# Patient Record
Sex: MALE | Race: OTHER | NOT HISPANIC OR LATINO
[De-identification: names, ages, dates, MRNs, and addresses within clinical notes are randomized per-mention and may not be internally consistent; named-entity substitution may affect disease eponyms.]

---

## 2017-01-10 ENCOUNTER — APPOINTMENT (OUTPATIENT)
Dept: OTHER | Facility: CLINIC | Age: 57
End: 2017-01-10

## 2017-01-10 ENCOUNTER — LABORATORY RESULT (OUTPATIENT)
Age: 57
End: 2017-01-10

## 2017-01-10 VITALS
BODY MASS INDEX: 34.96 KG/M2 | HEIGHT: 69 IN | HEART RATE: 61 BPM | OXYGEN SATURATION: 95 % | SYSTOLIC BLOOD PRESSURE: 136 MMHG | DIASTOLIC BLOOD PRESSURE: 89 MMHG | WEIGHT: 236 LBS

## 2017-01-10 DIAGNOSIS — Z23 ENCOUNTER FOR IMMUNIZATION: ICD-10-CM

## 2017-01-11 LAB
ALBUMIN SERPL ELPH-MCNC: 4.3 G/DL
ALP BLD-CCNC: 98 U/L
ALT SERPL-CCNC: 21 U/L
ANION GAP SERPL CALC-SCNC: 13 MMOL/L
APPEARANCE: CLEAR
AST SERPL-CCNC: 18 U/L
BASOPHILS # BLD AUTO: 0.05 K/UL
BASOPHILS NFR BLD AUTO: 0.2 %
BILIRUB SERPL-MCNC: 0.3 MG/DL
BILIRUBIN URINE: NEGATIVE
BLOOD URINE: NEGATIVE
BUN SERPL-MCNC: 18 MG/DL
CALCIUM SERPL-MCNC: 9.6 MG/DL
CHLORIDE SERPL-SCNC: 104 MMOL/L
CHOLEST SERPL-MCNC: 185 MG/DL
CHOLEST/HDLC SERPL: 2.9 RATIO
CO2 SERPL-SCNC: 24 MMOL/L
COLOR: YELLOW
CREAT SERPL-MCNC: 0.85 MG/DL
EOSINOPHIL # BLD AUTO: 0.16 K/UL
EOSINOPHIL NFR BLD AUTO: 0.5 %
GLUCOSE QUALITATIVE U: NORMAL MG/DL
GLUCOSE SERPL-MCNC: 80 MG/DL
HCT VFR BLD CALC: 42.6 %
HDLC SERPL-MCNC: 64 MG/DL
HGB BLD-MCNC: 14.3 G/DL
IMM GRANULOCYTES NFR BLD AUTO: 0.2 %
KETONES URINE: NEGATIVE
LDLC SERPL CALC-MCNC: 92 MG/DL
LEUKOCYTE ESTERASE URINE: NEGATIVE
LYMPHOCYTES # BLD AUTO: 24.01 K/UL
LYMPHOCYTES NFR BLD AUTO: 80.8 %
MAN DIFF?: NORMAL
MCHC RBC-ENTMCNC: 30 PG
MCHC RBC-ENTMCNC: 33.6 GM/DL
MCV RBC AUTO: 89.3 FL
MONOCYTES # BLD AUTO: 0.66 K/UL
MONOCYTES NFR BLD AUTO: 2.2 %
NEUTROPHILS # BLD AUTO: 4.76 K/UL
NEUTROPHILS NFR BLD AUTO: 16.1 %
NITRITE URINE: NEGATIVE
PH URINE: 5.5
PLATELET # BLD AUTO: 246 K/UL
POTASSIUM SERPL-SCNC: 4.6 MMOL/L
PROT SERPL-MCNC: 7.1 G/DL
PROTEIN URINE: NEGATIVE MG/DL
RBC # BLD: 4.77 M/UL
RBC # FLD: 13.1 %
SODIUM SERPL-SCNC: 141 MMOL/L
SPECIFIC GRAVITY URINE: 1.02
TRIGL SERPL-MCNC: 145 MG/DL
UROBILINOGEN URINE: NORMAL MG/DL
WBC # FLD AUTO: 29.71 K/UL

## 2017-03-21 ENCOUNTER — FORM ENCOUNTER (OUTPATIENT)
Age: 57
End: 2017-03-21

## 2017-07-21 ENCOUNTER — APPOINTMENT (OUTPATIENT)
Dept: HEMATOLOGY ONCOLOGY | Facility: CLINIC | Age: 57
End: 2017-07-21

## 2017-07-21 VITALS
RESPIRATION RATE: 12 BRPM | BODY MASS INDEX: 34.7 KG/M2 | TEMPERATURE: 98.4 F | WEIGHT: 235 LBS | SYSTOLIC BLOOD PRESSURE: 140 MMHG | OXYGEN SATURATION: 99 % | HEART RATE: 76 BPM | DIASTOLIC BLOOD PRESSURE: 84 MMHG

## 2017-07-21 RX ORDER — MULTIVITAMIN
TABLET ORAL
Refills: 0 | Status: ACTIVE | COMMUNITY

## 2017-07-21 RX ORDER — ASPIRIN 81 MG
81 TABLET, DELAYED RELEASE (ENTERIC COATED) ORAL
Refills: 0 | Status: ACTIVE | COMMUNITY

## 2017-07-24 LAB
ALBUMIN SERPL ELPH-MCNC: 4.2 G/DL
ALP BLD-CCNC: 94 U/L
ALT SERPL-CCNC: 24 U/L
ANION GAP SERPL CALC-SCNC: 15 MMOL/L
AST SERPL-CCNC: 24 U/L
BILIRUB SERPL-MCNC: 0.5 MG/DL
BUN SERPL-MCNC: 16 MG/DL
CALCIUM SERPL-MCNC: 9.6 MG/DL
CHLORIDE SERPL-SCNC: 104 MMOL/L
CO2 SERPL-SCNC: 23 MMOL/L
CREAT SERPL-MCNC: 0.96 MG/DL
GLUCOSE SERPL-MCNC: 80 MG/DL
LDH SERPL-CCNC: 207 U/L
POTASSIUM SERPL-SCNC: 4.4 MMOL/L
PROT SERPL-MCNC: 7.1 G/DL
SODIUM SERPL-SCNC: 142 MMOL/L
URATE SERPL-MCNC: 6.9 MG/DL

## 2018-01-09 ENCOUNTER — APPOINTMENT (OUTPATIENT)
Dept: OTHER | Facility: CLINIC | Age: 58
End: 2018-01-09
Payer: COMMERCIAL

## 2018-01-09 ENCOUNTER — LABORATORY RESULT (OUTPATIENT)
Age: 58
End: 2018-01-09

## 2018-01-09 VITALS
HEIGHT: 69 IN | HEART RATE: 63 BPM | SYSTOLIC BLOOD PRESSURE: 145 MMHG | OXYGEN SATURATION: 98 % | WEIGHT: 232 LBS | RESPIRATION RATE: 14 BRPM | DIASTOLIC BLOOD PRESSURE: 100 MMHG | BODY MASS INDEX: 34.36 KG/M2

## 2018-01-09 DIAGNOSIS — D72.820 LYMPHOCYTOSIS (SYMPTOMATIC): ICD-10-CM

## 2018-01-09 PROCEDURE — 99396 PREV VISIT EST AGE 40-64: CPT

## 2018-01-09 PROCEDURE — 96150: CPT

## 2018-01-09 PROCEDURE — 94010 BREATHING CAPACITY TEST: CPT

## 2018-01-09 PROCEDURE — 99214 OFFICE O/P EST MOD 30 MIN: CPT | Mod: 25

## 2018-01-10 LAB
ALBUMIN SERPL ELPH-MCNC: 4.2 G/DL
ALP BLD-CCNC: 82 U/L
ALT SERPL-CCNC: 21 U/L
ANION GAP SERPL CALC-SCNC: 15 MMOL/L
APPEARANCE: CLEAR
AST SERPL-CCNC: 19 U/L
BASOPHILS # BLD AUTO: 0.03 K/UL
BASOPHILS NFR BLD AUTO: 0.1 %
BILIRUB SERPL-MCNC: 0.4 MG/DL
BILIRUBIN URINE: NEGATIVE
BLOOD URINE: NEGATIVE
BUN SERPL-MCNC: 17 MG/DL
CALCIUM SERPL-MCNC: 9.2 MG/DL
CHLORIDE SERPL-SCNC: 103 MMOL/L
CHOLEST SERPL-MCNC: 180 MG/DL
CHOLEST/HDLC SERPL: 3.3 RATIO
CO2 SERPL-SCNC: 23 MMOL/L
COLOR: YELLOW
CREAT SERPL-MCNC: 0.71 MG/DL
EOSINOPHIL # BLD AUTO: 0.14 K/UL
EOSINOPHIL NFR BLD AUTO: 0.5 %
GLUCOSE QUALITATIVE U: NEGATIVE MG/DL
GLUCOSE SERPL-MCNC: 91 MG/DL
HCT VFR BLD CALC: 42 %
HDLC SERPL-MCNC: 54 MG/DL
HGB BLD-MCNC: 14.1 G/DL
IMM GRANULOCYTES NFR BLD AUTO: 0.2 %
KETONES URINE: NEGATIVE
LDLC SERPL CALC-MCNC: 105 MG/DL
LEUKOCYTE ESTERASE URINE: NEGATIVE
LYMPHOCYTES # BLD AUTO: 20.58 K/UL
LYMPHOCYTES NFR BLD AUTO: 78.9 %
MAN DIFF?: NORMAL
MCHC RBC-ENTMCNC: 29.9 PG
MCHC RBC-ENTMCNC: 33.6 GM/DL
MCV RBC AUTO: 89 FL
MONOCYTES # BLD AUTO: 0.64 K/UL
MONOCYTES NFR BLD AUTO: 2.5 %
NEUTROPHILS # BLD AUTO: 4.64 K/UL
NEUTROPHILS NFR BLD AUTO: 17.8 %
NITRITE URINE: NEGATIVE
PH URINE: 6
PLATELET # BLD AUTO: 246 K/UL
POTASSIUM SERPL-SCNC: 4.7 MMOL/L
PROT SERPL-MCNC: 7.1 G/DL
PROTEIN URINE: NEGATIVE MG/DL
RBC # BLD: 4.72 M/UL
RBC # FLD: 13.2 %
SODIUM SERPL-SCNC: 141 MMOL/L
SPECIFIC GRAVITY URINE: 1.02
TRIGL SERPL-MCNC: 106 MG/DL
UROBILINOGEN URINE: NEGATIVE MG/DL
WBC # FLD AUTO: 26.08 K/UL

## 2018-01-16 ENCOUNTER — RX RENEWAL (OUTPATIENT)
Age: 58
End: 2018-01-16

## 2018-01-16 ENCOUNTER — TRANSCRIPTION ENCOUNTER (OUTPATIENT)
Age: 58
End: 2018-01-16

## 2018-04-18 ENCOUNTER — RX RENEWAL (OUTPATIENT)
Age: 58
End: 2018-04-18

## 2018-07-31 ENCOUNTER — APPOINTMENT (OUTPATIENT)
Dept: HEMATOLOGY ONCOLOGY | Facility: CLINIC | Age: 58
End: 2018-07-31
Payer: COMMERCIAL

## 2018-07-31 VITALS
OXYGEN SATURATION: 97 % | WEIGHT: 234 LBS | DIASTOLIC BLOOD PRESSURE: 92 MMHG | HEART RATE: 63 BPM | BODY MASS INDEX: 34.56 KG/M2 | TEMPERATURE: 98.5 F | RESPIRATION RATE: 14 BRPM | SYSTOLIC BLOOD PRESSURE: 130 MMHG

## 2018-07-31 PROCEDURE — 85025 COMPLETE CBC W/AUTO DIFF WBC: CPT

## 2018-07-31 PROCEDURE — 99215 OFFICE O/P EST HI 40 MIN: CPT

## 2018-08-01 LAB
ALBUMIN SERPL ELPH-MCNC: 4.3 G/DL
ALP BLD-CCNC: 101 U/L
ALT SERPL-CCNC: 15 U/L
ANION GAP SERPL CALC-SCNC: 11 MMOL/L
AST SERPL-CCNC: 17 U/L
BILIRUB SERPL-MCNC: 0.4 MG/DL
BUN SERPL-MCNC: 16 MG/DL
CALCIUM SERPL-MCNC: 9.4 MG/DL
CHLORIDE SERPL-SCNC: 104 MMOL/L
CO2 SERPL-SCNC: 28 MMOL/L
CREAT SERPL-MCNC: 0.67 MG/DL
GLUCOSE SERPL-MCNC: 87 MG/DL
LDH SERPL-CCNC: 173 U/L
POTASSIUM SERPL-SCNC: 4.8 MMOL/L
PROT SERPL-MCNC: 6.6 G/DL
SODIUM SERPL-SCNC: 143 MMOL/L
URATE SERPL-MCNC: 4.9 MG/DL

## 2018-10-01 ENCOUNTER — RX RENEWAL (OUTPATIENT)
Age: 58
End: 2018-10-01

## 2019-01-29 ENCOUNTER — LABORATORY RESULT (OUTPATIENT)
Age: 59
End: 2019-01-29

## 2019-01-29 ENCOUNTER — APPOINTMENT (OUTPATIENT)
Dept: UROLOGY | Facility: CLINIC | Age: 59
End: 2019-01-29
Payer: COMMERCIAL

## 2019-01-29 ENCOUNTER — APPOINTMENT (OUTPATIENT)
Dept: OTHER | Facility: CLINIC | Age: 59
End: 2019-01-29
Payer: COMMERCIAL

## 2019-01-29 VITALS
WEIGHT: 235 LBS | SYSTOLIC BLOOD PRESSURE: 129 MMHG | RESPIRATION RATE: 16 BRPM | HEART RATE: 73 BPM | BODY MASS INDEX: 34.8 KG/M2 | HEIGHT: 69 IN | OXYGEN SATURATION: 97 % | DIASTOLIC BLOOD PRESSURE: 85 MMHG

## 2019-01-29 VITALS — SYSTOLIC BLOOD PRESSURE: 126 MMHG | OXYGEN SATURATION: 97 % | DIASTOLIC BLOOD PRESSURE: 78 MMHG | HEART RATE: 68 BPM

## 2019-01-29 DIAGNOSIS — M17.10 UNILATERAL PRIMARY OSTEOARTHRITIS, UNSPECIFIED KNEE: ICD-10-CM

## 2019-01-29 PROCEDURE — 99204 OFFICE O/P NEW MOD 45 MIN: CPT

## 2019-01-29 PROCEDURE — 99396 PREV VISIT EST AGE 40-64: CPT | Mod: 25

## 2019-01-29 PROCEDURE — 94010 BREATHING CAPACITY TEST: CPT

## 2019-01-29 PROCEDURE — 96150: CPT

## 2019-01-29 PROCEDURE — 99214 OFFICE O/P EST MOD 30 MIN: CPT | Mod: 25

## 2019-01-29 NOTE — ASSESSMENT
[FreeTextEntry1] : Very pleasant 58-year-old gentleman with gross hematuria, hematospermia, BPH\par -urinalysis\par -urine culture\par -urine cytology\par -PSA\par -CT Urogram\par -cystoscopy\par -Extensive discussion of the potential etiologies of gross hematuria, as well as the need to complete full work up.  Patient understands and wishes to proceed.\par -trial of finasteride

## 2019-01-29 NOTE — REVIEW OF SYSTEMS
[Constipation] : constipation [Loss of interest] : loss of interest in sexual activity [Poor quality erections] : Poor quality erections [Urine Infection (bladder/kidney)] : bladder/kidney infection [Pain during urination] : pain during urination [Blood in urine that you can see] : blood visible in urine [Interrupted urine stream] : interrupted urine stream [Negative] : Heme/Lymph

## 2019-01-29 NOTE — HISTORY OF PRESENT ILLNESS
[FreeTextEntry1] : Very pleasant 58-year-old gentleman who presents for evaluation of gross hematuria and hematospermia. Patient reports that his symptoms started 1-2 weeks ago. He reports a history of gross hematuria approximately 4 years ago for which he was evaluated by another urologist and underwent a cystoscopy. He reports that there were no abnormalities seen this time. He denies dysuria. No flank pain or suprapubic pain. He believes he may have a urinary tract infection, as he was told he did in 2015 during which time he had the same symptoms.\par \par Patient describes hematuria as occurring at the initial part of his stream. He denies concern for sexually transmitted diseases.\par \par No family history of  malignancy. Does not smoke. [Urinary Retention] : no urinary retention [Urinary Urgency] : no urinary urgency [Urinary Frequency] : no urinary frequency [Nocturia] : no nocturia [Straining] : no straining [Weak Stream] : no weak stream [Dysuria] : no dysuria [Hematuria - Gross] : gross hematuria [Bladder Spasm] : no bladder spasm [Abdominal Pain] : no abdominal pain [Flank Pain] : no flank pain [Fever] : no fever [Fatigue] : no fatigue [Nausea] : no nausea

## 2019-01-31 LAB
ALBUMIN SERPL ELPH-MCNC: 4 G/DL
ALP BLD-CCNC: 84 U/L
ALT SERPL-CCNC: 20 U/L
ANION GAP SERPL CALC-SCNC: 12 MMOL/L
APPEARANCE: ABNORMAL
AST SERPL-CCNC: 16 U/L
BACTERIA: NEGATIVE
BASOPHILS # BLD AUTO: 0 K/UL
BASOPHILS NFR BLD AUTO: 0 %
BILIRUB SERPL-MCNC: 0.4 MG/DL
BILIRUBIN URINE: ABNORMAL
BLOOD URINE: ABNORMAL
BUN SERPL-MCNC: 14 MG/DL
CALCIUM SERPL-MCNC: 9 MG/DL
CHLORIDE SERPL-SCNC: 105 MMOL/L
CHOLEST SERPL-MCNC: 151 MG/DL
CHOLEST/HDLC SERPL: 3.1 RATIO
CO2 SERPL-SCNC: 26 MMOL/L
COLOR: ABNORMAL
CREAT SERPL-MCNC: 0.68 MG/DL
EOSINOPHIL # BLD AUTO: 0.27 K/UL
EOSINOPHIL NFR BLD AUTO: 1 %
GLUCOSE QUALITATIVE U: NEGATIVE MG/DL
GLUCOSE SERPL-MCNC: 97 MG/DL
HCT VFR BLD CALC: 41.2 %
HDLC SERPL-MCNC: 48 MG/DL
HGB BLD-MCNC: 13.2 G/DL
HYALINE CASTS: 1 /LPF
KETONES URINE: NEGATIVE
LDLC SERPL CALC-MCNC: 78 MG/DL
LEUKOCYTE ESTERASE URINE: ABNORMAL
LYMPHOCYTES # BLD AUTO: 21.83 K/UL
LYMPHOCYTES NFR BLD AUTO: 81 %
MAN DIFF?: NORMAL
MCHC RBC-ENTMCNC: 29.9 PG
MCHC RBC-ENTMCNC: 32 GM/DL
MCV RBC AUTO: 93.2 FL
MICROSCOPIC-UA: NORMAL
MONOCYTES # BLD AUTO: 0.54 K/UL
MONOCYTES NFR BLD AUTO: 2 %
NEUTROPHILS # BLD AUTO: 3.77 K/UL
NEUTROPHILS NFR BLD AUTO: 14 %
NITRITE URINE: NEGATIVE
PH URINE: 5.5
PLATELET # BLD AUTO: 242 K/UL
POTASSIUM SERPL-SCNC: 4.1 MMOL/L
PROT SERPL-MCNC: 6.6 G/DL
PROTEIN URINE: 100 MG/DL
RBC # BLD: 4.42 M/UL
RBC # FLD: 13.4 %
RED BLOOD CELLS URINE: ABNORMAL
SODIUM SERPL-SCNC: 143 MMOL/L
SPECIFIC GRAVITY URINE: 1.03
SQUAMOUS EPITHELIAL CELLS: 0 /HPF
TRIGL SERPL-MCNC: 124 MG/DL
UROBILINOGEN URINE: NEGATIVE MG/DL
WBC # FLD AUTO: 26.95 K/UL
WHITE BLOOD CELLS URINE: >720 /HPF

## 2019-02-01 ENCOUNTER — APPOINTMENT (OUTPATIENT)
Dept: UROLOGY | Facility: CLINIC | Age: 59
End: 2019-02-01
Payer: COMMERCIAL

## 2019-02-01 LAB
PSA FREE FLD-MCNC: 3 %
PSA FREE SERPL-MCNC: 0.07 NG/ML
PSA SERPL-MCNC: 2.5 NG/ML

## 2019-02-01 PROCEDURE — 99214 OFFICE O/P EST MOD 30 MIN: CPT

## 2019-02-01 NOTE — PHYSICAL EXAM
[General Appearance - Well Developed] : well developed [General Appearance - Well Nourished] : well nourished [Normal Appearance] : normal appearance [Well Groomed] : well groomed [General Appearance - In No Acute Distress] : no acute distress [Abdomen Soft] : soft [Abdomen Tenderness] : non-tender [Costovertebral Angle Tenderness] : no ~M costovertebral angle tenderness [Urethral Meatus] : meatus normal [Urinary Bladder Findings] : the bladder was normal on palpation [Scrotum] : the scrotum was normal [FreeTextEntry1] : right scrotal swelling [Edema] : no peripheral edema [] : no respiratory distress [Respiration, Rhythm And Depth] : normal respiratory rhythm and effort [Exaggerated Use Of Accessory Muscles For Inspiration] : no accessory muscle use [Oriented To Time, Place, And Person] : oriented to person, place, and time [Affect] : the affect was normal [Mood] : the mood was normal [Not Anxious] : not anxious [Normal Station and Gait] : the gait and station were normal for the patient's age [No Focal Deficits] : no focal deficits [No Palpable Adenopathy] : no palpable adenopathy

## 2019-02-01 NOTE — HISTORY OF PRESENT ILLNESS
[FreeTextEntry1] : Very pleasant 58-year-old gentleman who presents for followup of gross hematuria, UTI, new symptom of right scrotal swelling and epididymitis.  A urinalysis from 1/29/2019 demonstrated concern for infection. At that time, a urine culture was sent, however has not been processed by the lab yet. A PSA at that time was normal. He presents today with complaints of right scrotal swelling and tenderness. He reports that this started last night. He reports persistent dysuria and mild persistent hematuria. No flank pain or suprapubic pain. No fevers or chills. No other complaints. [Urinary Retention] : no urinary retention [Urinary Urgency] : no urinary urgency [Urinary Frequency] : no urinary frequency [Nocturia] : no nocturia [Straining] : no straining [Weak Stream] : no weak stream [Dysuria] : dysuria [Hematuria - Gross] : gross hematuria [Bladder Spasm] : no bladder spasm [Abdominal Pain] : no abdominal pain [Flank Pain] : no flank pain [Fever] : no fever [Fatigue] : no fatigue [Nausea] : no nausea

## 2019-02-03 ENCOUNTER — OUTPATIENT (OUTPATIENT)
Dept: OUTPATIENT SERVICES | Facility: HOSPITAL | Age: 59
LOS: 1 days | End: 2019-02-03
Payer: COMMERCIAL

## 2019-02-03 ENCOUNTER — APPOINTMENT (OUTPATIENT)
Dept: CT IMAGING | Facility: IMAGING CENTER | Age: 59
End: 2019-02-03
Payer: COMMERCIAL

## 2019-02-03 DIAGNOSIS — R31.0 GROSS HEMATURIA: ICD-10-CM

## 2019-02-03 PROCEDURE — 74178 CT ABD&PLV WO CNTR FLWD CNTR: CPT | Mod: 26

## 2019-02-03 PROCEDURE — 74178 CT ABD&PLV WO CNTR FLWD CNTR: CPT

## 2019-02-04 LAB — BACTERIA UR CULT: NORMAL

## 2019-02-05 ENCOUNTER — APPOINTMENT (OUTPATIENT)
Dept: CT IMAGING | Facility: IMAGING CENTER | Age: 59
End: 2019-02-05

## 2019-02-06 ENCOUNTER — APPOINTMENT (OUTPATIENT)
Dept: CT IMAGING | Facility: IMAGING CENTER | Age: 59
End: 2019-02-06

## 2019-02-08 ENCOUNTER — APPOINTMENT (OUTPATIENT)
Dept: UROLOGY | Facility: CLINIC | Age: 59
End: 2019-02-08

## 2019-02-12 ENCOUNTER — FORM ENCOUNTER (OUTPATIENT)
Age: 59
End: 2019-02-12

## 2019-02-18 ENCOUNTER — OUTPATIENT (OUTPATIENT)
Dept: OUTPATIENT SERVICES | Facility: HOSPITAL | Age: 59
LOS: 1 days | End: 2019-02-18
Payer: COMMERCIAL

## 2019-02-18 ENCOUNTER — APPOINTMENT (OUTPATIENT)
Dept: ULTRASOUND IMAGING | Facility: CLINIC | Age: 59
End: 2019-02-18

## 2019-02-18 DIAGNOSIS — Z00.8 ENCOUNTER FOR OTHER GENERAL EXAMINATION: ICD-10-CM

## 2019-02-18 PROCEDURE — 76870 US EXAM SCROTUM: CPT

## 2019-02-18 PROCEDURE — 76870 US EXAM SCROTUM: CPT | Mod: 26

## 2019-03-02 ENCOUNTER — TRANSCRIPTION ENCOUNTER (OUTPATIENT)
Age: 59
End: 2019-03-02

## 2019-03-05 ENCOUNTER — FORM ENCOUNTER (OUTPATIENT)
Age: 59
End: 2019-03-05

## 2019-03-06 ENCOUNTER — APPOINTMENT (OUTPATIENT)
Dept: UROLOGY | Facility: CLINIC | Age: 59
End: 2019-03-06
Payer: COMMERCIAL

## 2019-03-06 ENCOUNTER — APPOINTMENT (OUTPATIENT)
Dept: HEMATOLOGY ONCOLOGY | Facility: CLINIC | Age: 59
End: 2019-03-06
Payer: COMMERCIAL

## 2019-03-06 VITALS — HEART RATE: 60 BPM | OXYGEN SATURATION: 98 % | SYSTOLIC BLOOD PRESSURE: 149 MMHG | DIASTOLIC BLOOD PRESSURE: 96 MMHG

## 2019-03-06 VITALS — SYSTOLIC BLOOD PRESSURE: 140 MMHG | RESPIRATION RATE: 16 BRPM | HEART RATE: 56 BPM | DIASTOLIC BLOOD PRESSURE: 90 MMHG

## 2019-03-06 VITALS — WEIGHT: 235 LBS | BODY MASS INDEX: 34.7 KG/M2

## 2019-03-06 DIAGNOSIS — R59.1 GENERALIZED ENLARGED LYMPH NODES: ICD-10-CM

## 2019-03-06 DIAGNOSIS — R36.1 HEMATOSPERMIA: ICD-10-CM

## 2019-03-06 PROCEDURE — 85025 COMPLETE CBC W/AUTO DIFF WBC: CPT

## 2019-03-06 PROCEDURE — 99214 OFFICE O/P EST MOD 30 MIN: CPT

## 2019-03-06 PROCEDURE — 99215 OFFICE O/P EST HI 40 MIN: CPT

## 2019-03-06 NOTE — HISTORY OF PRESENT ILLNESS
[FreeTextEntry1] : Very pleasant 58-year-old gentleman who presents for followup of right epididymitis and no finding of bilateral inguinal adenopathy on CT scan. Patient has a history of CLL which is in remission. Patient has been treated with Cipro as well as a shorter course of Bactrim with improvement in his symptoms of epididymitis. He still reports mild right scrotal swelling and mild right scrotal pain. He denies dysuria. No hematuria. No flank pain or suprapubic pain. He is here to discuss results of recent ultrasound as well as CT scan. [Urinary Retention] : no urinary retention [Urinary Urgency] : no urinary urgency [Urinary Frequency] : no urinary frequency [Nocturia] : no nocturia [Straining] : no straining [Weak Stream] : no weak stream [Dysuria] : no dysuria [Hematuria - Gross] : no gross hematuria [Bladder Spasm] : no bladder spasm [Abdominal Pain] : no abdominal pain [Flank Pain] : no flank pain [Fever] : no fever [Fatigue] : no fatigue [Nausea] : no nausea

## 2019-03-06 NOTE — PHYSICAL EXAM
[General Appearance - Well Developed] : well developed [General Appearance - Well Nourished] : well nourished [Normal Appearance] : normal appearance [Well Groomed] : well groomed [General Appearance - In No Acute Distress] : no acute distress [Abdomen Soft] : soft [Abdomen Tenderness] : non-tender [Costovertebral Angle Tenderness] : no ~M costovertebral angle tenderness [Urethral Meatus] : meatus normal [Urinary Bladder Findings] : the bladder was normal on palpation [Scrotum] : the scrotum was normal [FreeTextEntry1] : improved right scrotal swelling [Edema] : no peripheral edema [] : no respiratory distress [Respiration, Rhythm And Depth] : normal respiratory rhythm and effort [Exaggerated Use Of Accessory Muscles For Inspiration] : no accessory muscle use [Oriented To Time, Place, And Person] : oriented to person, place, and time [Affect] : the affect was normal [Mood] : the mood was normal [Not Anxious] : not anxious [Normal Station and Gait] : the gait and station were normal for the patient's age [No Focal Deficits] : no focal deficits [No Palpable Adenopathy] : no palpable adenopathy

## 2019-03-06 NOTE — ASSESSMENT
[FreeTextEntry1] : Very pleasant 58-year-old gentleman who presents for followup of gross hematuria, right scrotal swelling, right epididymitis, lymphadenopathy\par -CT images reviewed\par -Ultrasound images reviewed\par -Prior labs reviewed\par -Given incomplete resolution of epididymitis in the setting of improvement, we will extended course of antibiotics with Bactrim for another 10 days\par -We discussed the concern of lymphadenopathy with his history of CLL\par -I have recommended that he undergo biopsy of a 2.5 cm right external iliac lymph node\par -Will discuss with IR as to the feasibility of biopsy\par -Follow up after biopsy

## 2019-03-07 ENCOUNTER — OUTPATIENT (OUTPATIENT)
Dept: OUTPATIENT SERVICES | Facility: HOSPITAL | Age: 59
LOS: 1 days | Discharge: HOME | End: 2019-03-07

## 2019-03-07 DIAGNOSIS — D72.820 LYMPHOCYTOSIS (SYMPTOMATIC): ICD-10-CM

## 2019-03-07 LAB
ALBUMIN SERPL ELPH-MCNC: 4.3 G/DL
ALP BLD-CCNC: 100 U/L
ALT SERPL-CCNC: 19 U/L
ANION GAP SERPL CALC-SCNC: 12 MMOL/L
APPEARANCE: CLEAR
AST SERPL-CCNC: 16 U/L
BILIRUB SERPL-MCNC: 0.2 MG/DL
BILIRUBIN URINE: NEGATIVE
BLOOD URINE: NEGATIVE
BUN SERPL-MCNC: 16 MG/DL
CALCIUM SERPL-MCNC: 9.4 MG/DL
CHLORIDE SERPL-SCNC: 103 MMOL/L
CO2 SERPL-SCNC: 26 MMOL/L
COLOR: NORMAL
CREAT SERPL-MCNC: 0.76 MG/DL
GLUCOSE QUALITATIVE U: NEGATIVE
GLUCOSE SERPL-MCNC: 94 MG/DL
HBV CORE IGG+IGM SER QL: NONREACTIVE
HBV SURFACE AB SER QL: NONREACTIVE
HBV SURFACE AG SER QL: NONREACTIVE
HCV AB SER QL: NONREACTIVE
HCV S/CO RATIO: 0.12 S/CO
HIV1+2 AB SPEC QL IA.RAPID: NONREACTIVE
INR PPP: 1.05 RATIO
KETONES URINE: NEGATIVE
LDH SERPL-CCNC: 167 U/L
LEUKOCYTE ESTERASE URINE: NEGATIVE
NITRITE URINE: NEGATIVE
PH URINE: 6.5
POTASSIUM SERPL-SCNC: 4.3 MMOL/L
PROT SERPL-MCNC: 7 G/DL
PROTEIN URINE: NEGATIVE
PT BLD: 12.1 SEC
SODIUM SERPL-SCNC: 140 MMOL/L
SPECIFIC GRAVITY URINE: 1.01
URATE SERPL-MCNC: 5.3 MG/DL
UROBILINOGEN URINE: NORMAL

## 2019-03-09 LAB
DEPRECATED KAPPA LC FREE/LAMBDA SER: 1.17 RATIO
IGA SER QL IEP: 174 MG/DL
IGG SER QL IEP: 1062 MG/DL
IGM SER QL IEP: 125 MG/DL
KAPPA LC CSF-MCNC: 1.63 MG/DL
KAPPA LC SERPL-MCNC: 1.91 MG/DL

## 2019-03-09 NOTE — RESULTS/DATA
[FreeTextEntry1] : CBC done 3/6/19:\par WBC: 27.3\par A.50\par ANC: 4.34\par Hgb: 12.9\par Hct: 38.9\par Plt: 205.0

## 2019-03-09 NOTE — REVIEW OF SYSTEMS
[Negative] : Genitourinary [FreeTextEntry6] : asthma, stable [FreeTextEntry8] : right scrotal pain and swelling

## 2019-03-09 NOTE — ASSESSMENT
[FreeTextEntry1] : Mr. Henderson is a 58 year old male with CLL (diagnosed 2008, 13q deletion, mutated IgVH), treatment naive, who presents for follow-up. As of January 2019 he has ongoing right epididymis. \par \par Plan:\par 1)CLL: no B symptoms or new lymphadenopathy, no indication at this moment for CLL-directed therapy, continue to monitor closely\par 2) Ongoing right epididymitis: will test today for immunoglobulin panel , hepatitis serologies, chlamydia serologies, HIV. We will start patient on IVIG this Monday to see if this helps clear infection\par 3) CT 2/3/19 showing external  inguinal lymph node measuring 2.5 cm x 1.8 cm, expected from CLL. Will obtain PET CT whole body and if there is a hypermetabolic area of concern, will then will discuss with IR to set up biopsy if necessary. coags ordered\par 4)continue routine follow-up at [facility] clinic as scheduled\par 5)continue routine health maintenance and age appropriate screening as indicated, discussed need for annual dermatology evaluation, Shingrix vaccine and colonoscopies as recommended\par

## 2019-03-09 NOTE — HISTORY OF PRESENT ILLNESS
[de-identified] :  is a 58 year old male with CLL (Diagnosed 2008, 13q del, mutated IgVH), treatment naive, who presents for his yearly follow-up. He continues to get monitoring through the [facility] clinic under the care of Dr. Shelby.\par \par As on January 2019 patient has had an ongoing right epididymitis. He was on a course of Cipro and Bactrim by his urologist with some improvement, however on recent visit today he still report right scrotal swelling and pain.He is to continue Bactrim for another 4 days. he states the Bactrim has helped. He also had a CT scan recently showing  right external  inguinal lymph node measuring 2.5 cm x 1.8 cm. It was suggested to patient by urology to have it biopsied. \par \par He overall feels okay, despite ongoing infection. No fevers or drenching night sweats. Good appetite. Weight overall stable. Normal bowel movements. No chest pain or shortness of breath. Energy stable. [de-identified] : 13qdel\par Mutated IgVH

## 2019-03-09 NOTE — PHYSICAL EXAM
[Fully active, able to carry on all pre-disease performance without restriction] : Status 0 - Fully active, able to carry on all pre-disease performance without restriction [Normal] : affect appropriate [de-identified] : EOMI, anicteric sclera, conjunctiva normal [de-identified] : NICKI [de-identified] : no c/c/e

## 2019-03-11 ENCOUNTER — APPOINTMENT (OUTPATIENT)
Dept: INFUSION THERAPY | Facility: HOSPITAL | Age: 59
End: 2019-03-11

## 2019-03-11 LAB
C PNEUM IGG SER QL: NORMAL TITER
C PNEUM IGM SER QL: NORMAL TITER
C PSITTACI IGG SER QL: NORMAL TITER
C PSITTACI IGM SER QL: NORMAL TITER
C TRACH B IGG SER QL: NORMAL TITER
C TRACH B IGM SER QL: NORMAL TITER

## 2019-03-12 DIAGNOSIS — R11.2 NAUSEA WITH VOMITING, UNSPECIFIED: ICD-10-CM

## 2019-03-12 DIAGNOSIS — C91.10 CHRONIC LYMPHOCYTIC LEUKEMIA OF B-CELL TYPE NOT HAVING ACHIEVED REMISSION: ICD-10-CM

## 2019-03-21 ENCOUNTER — RX CHANGE (OUTPATIENT)
Age: 59
End: 2019-03-21

## 2019-03-23 ENCOUNTER — APPOINTMENT (OUTPATIENT)
Dept: NUCLEAR MEDICINE | Facility: IMAGING CENTER | Age: 59
End: 2019-03-23
Payer: COMMERCIAL

## 2019-03-23 ENCOUNTER — OUTPATIENT (OUTPATIENT)
Dept: OUTPATIENT SERVICES | Facility: HOSPITAL | Age: 59
LOS: 1 days | End: 2019-03-23
Payer: COMMERCIAL

## 2019-03-23 DIAGNOSIS — R59.1 GENERALIZED ENLARGED LYMPH NODES: ICD-10-CM

## 2019-03-23 PROCEDURE — 78815 PET IMAGE W/CT SKULL-THIGH: CPT | Mod: 26,PS

## 2019-03-23 PROCEDURE — 78815 PET IMAGE W/CT SKULL-THIGH: CPT

## 2019-03-23 PROCEDURE — A9552: CPT

## 2019-03-25 ENCOUNTER — MESSAGE (OUTPATIENT)
Age: 59
End: 2019-03-25

## 2019-03-26 ENCOUNTER — RX RENEWAL (OUTPATIENT)
Age: 59
End: 2019-03-26

## 2019-03-29 ENCOUNTER — OUTPATIENT (OUTPATIENT)
Dept: OUTPATIENT SERVICES | Facility: HOSPITAL | Age: 59
LOS: 1 days | Discharge: HOME | End: 2019-03-29

## 2019-03-29 DIAGNOSIS — D72.820 LYMPHOCYTOSIS (SYMPTOMATIC): ICD-10-CM

## 2019-04-09 ENCOUNTER — APPOINTMENT (OUTPATIENT)
Dept: INFUSION THERAPY | Facility: HOSPITAL | Age: 59
End: 2019-04-09

## 2019-04-10 DIAGNOSIS — R11.2 NAUSEA WITH VOMITING, UNSPECIFIED: ICD-10-CM

## 2019-04-10 DIAGNOSIS — C91.10 CHRONIC LYMPHOCYTIC LEUKEMIA OF B-CELL TYPE NOT HAVING ACHIEVED REMISSION: ICD-10-CM

## 2019-04-12 ENCOUNTER — APPOINTMENT (OUTPATIENT)
Dept: UROLOGY | Facility: CLINIC | Age: 59
End: 2019-04-12
Payer: COMMERCIAL

## 2019-04-12 VITALS
DIASTOLIC BLOOD PRESSURE: 91 MMHG | WEIGHT: 224 LBS | SYSTOLIC BLOOD PRESSURE: 136 MMHG | BODY MASS INDEX: 33.18 KG/M2 | HEIGHT: 69 IN | HEART RATE: 63 BPM

## 2019-04-12 DIAGNOSIS — N39.0 URINARY TRACT INFECTION, SITE NOT SPECIFIED: ICD-10-CM

## 2019-04-12 DIAGNOSIS — Z87.448 PERSONAL HISTORY OF OTHER DISEASES OF URINARY SYSTEM: ICD-10-CM

## 2019-04-12 DIAGNOSIS — R31.0 GROSS HEMATURIA: ICD-10-CM

## 2019-04-12 PROCEDURE — 99214 OFFICE O/P EST MOD 30 MIN: CPT

## 2019-04-12 RX ORDER — OXYBUTYNIN CHLORIDE 10 MG/1
10 TABLET, EXTENDED RELEASE ORAL
Qty: 30 | Refills: 1 | Status: DISCONTINUED | COMMUNITY
Start: 2019-04-12 | End: 2019-04-12

## 2019-04-12 NOTE — ASSESSMENT
[FreeTextEntry1] : Very pleasant 58-year-old gentleman who presents for followup of right epididymoorchitis\par -Given incomplete resolution of epididymoorchitis, will continue Bactrim for another 14 days\par -repeat Scrotal US\par -Will call with results of scrotal US\par

## 2019-04-12 NOTE — PHYSICAL EXAM
[General Appearance - Well Nourished] : well nourished [General Appearance - Well Developed] : well developed [Normal Appearance] : normal appearance [Well Groomed] : well groomed [General Appearance - In No Acute Distress] : no acute distress [Abdomen Soft] : soft [Abdomen Tenderness] : non-tender [Costovertebral Angle Tenderness] : no ~M costovertebral angle tenderness [Urethral Meatus] : meatus normal [Urinary Bladder Findings] : the bladder was normal on palpation [Scrotum] : the scrotum was normal [FreeTextEntry1] : improved right scrotal swelling [Edema] : no peripheral edema [] : no respiratory distress [Respiration, Rhythm And Depth] : normal respiratory rhythm and effort [Exaggerated Use Of Accessory Muscles For Inspiration] : no accessory muscle use [Oriented To Time, Place, And Person] : oriented to person, place, and time [Affect] : the affect was normal [Not Anxious] : not anxious [Mood] : the mood was normal [Normal Station and Gait] : the gait and station were normal for the patient's age [No Focal Deficits] : no focal deficits [No Palpable Adenopathy] : no palpable adenopathy

## 2019-04-12 NOTE — HISTORY OF PRESENT ILLNESS
[FreeTextEntry1] : Very pleasant 50-year-old gentleman who presents for followup of right epididymal orchitis. Patient reports that scrotal swelling is significantly improved. Additionally, scrotal pain is significantly improved. He denies dysuria. No hematuria. No flank pain or suprapubic pain. No other complaints. [Urinary Retention] : no urinary retention [Urinary Urgency] : no urinary urgency [Urinary Frequency] : no urinary frequency [Nocturia] : no nocturia [Straining] : no straining [Weak Stream] : no weak stream [Dysuria] : no dysuria [Hematuria - Gross] : no gross hematuria [Bladder Spasm] : no bladder spasm [Abdominal Pain] : no abdominal pain [Flank Pain] : no flank pain [Fever] : no fever [Fatigue] : no fatigue [Nausea] : no nausea

## 2019-04-23 ENCOUNTER — FORM ENCOUNTER (OUTPATIENT)
Age: 59
End: 2019-04-23

## 2019-04-23 ENCOUNTER — APPOINTMENT (OUTPATIENT)
Dept: ULTRASOUND IMAGING | Facility: CLINIC | Age: 59
End: 2019-04-23
Payer: COMMERCIAL

## 2019-04-23 ENCOUNTER — OUTPATIENT (OUTPATIENT)
Dept: OUTPATIENT SERVICES | Facility: HOSPITAL | Age: 59
LOS: 1 days | End: 2019-04-23
Payer: COMMERCIAL

## 2019-04-23 DIAGNOSIS — Z00.8 ENCOUNTER FOR OTHER GENERAL EXAMINATION: ICD-10-CM

## 2019-04-23 PROCEDURE — 93976 VASCULAR STUDY: CPT

## 2019-04-23 PROCEDURE — 76870 US EXAM SCROTUM: CPT | Mod: 26

## 2019-04-23 PROCEDURE — 76870 US EXAM SCROTUM: CPT

## 2019-04-24 ENCOUNTER — FORM ENCOUNTER (OUTPATIENT)
Age: 59
End: 2019-04-24

## 2019-04-29 ENCOUNTER — RX CHANGE (OUTPATIENT)
Age: 59
End: 2019-04-29

## 2019-05-02 ENCOUNTER — OUTPATIENT (OUTPATIENT)
Dept: OUTPATIENT SERVICES | Facility: HOSPITAL | Age: 59
LOS: 1 days | Discharge: HOME | End: 2019-05-02

## 2019-05-02 DIAGNOSIS — D72.820 LYMPHOCYTOSIS (SYMPTOMATIC): ICD-10-CM

## 2019-05-07 ENCOUNTER — APPOINTMENT (OUTPATIENT)
Dept: INFUSION THERAPY | Facility: HOSPITAL | Age: 59
End: 2019-05-07

## 2019-05-08 DIAGNOSIS — R11.2 NAUSEA WITH VOMITING, UNSPECIFIED: ICD-10-CM

## 2019-05-08 DIAGNOSIS — C91.10 CHRONIC LYMPHOCYTIC LEUKEMIA OF B-CELL TYPE NOT HAVING ACHIEVED REMISSION: ICD-10-CM

## 2019-05-08 DIAGNOSIS — E86.0 DEHYDRATION: ICD-10-CM

## 2019-05-16 ENCOUNTER — FORM ENCOUNTER (OUTPATIENT)
Age: 59
End: 2019-05-16

## 2019-06-06 ENCOUNTER — OUTPATIENT (OUTPATIENT)
Dept: OUTPATIENT SERVICES | Facility: HOSPITAL | Age: 59
LOS: 1 days | Discharge: HOME | End: 2019-06-06

## 2019-06-06 DIAGNOSIS — D72.820 LYMPHOCYTOSIS (SYMPTOMATIC): ICD-10-CM

## 2019-06-09 ENCOUNTER — FORM ENCOUNTER (OUTPATIENT)
Age: 59
End: 2019-06-09

## 2019-06-11 ENCOUNTER — APPOINTMENT (OUTPATIENT)
Dept: INFUSION THERAPY | Facility: HOSPITAL | Age: 59
End: 2019-06-11

## 2019-06-12 DIAGNOSIS — R11.2 NAUSEA WITH VOMITING, UNSPECIFIED: ICD-10-CM

## 2019-06-12 DIAGNOSIS — C91.10 CHRONIC LYMPHOCYTIC LEUKEMIA OF B-CELL TYPE NOT HAVING ACHIEVED REMISSION: ICD-10-CM

## 2019-07-10 ENCOUNTER — OUTPATIENT (OUTPATIENT)
Dept: OUTPATIENT SERVICES | Facility: HOSPITAL | Age: 59
LOS: 1 days | Discharge: HOME | End: 2019-07-10

## 2019-07-10 DIAGNOSIS — D72.820 LYMPHOCYTOSIS (SYMPTOMATIC): ICD-10-CM

## 2019-07-15 ENCOUNTER — APPOINTMENT (OUTPATIENT)
Dept: INFUSION THERAPY | Facility: HOSPITAL | Age: 59
End: 2019-07-15

## 2019-07-16 ENCOUNTER — MEDICATION RENEWAL (OUTPATIENT)
Age: 59
End: 2019-07-16

## 2019-07-16 DIAGNOSIS — C91.10 CHRONIC LYMPHOCYTIC LEUKEMIA OF B-CELL TYPE NOT HAVING ACHIEVED REMISSION: ICD-10-CM

## 2019-07-16 DIAGNOSIS — R11.2 NAUSEA WITH VOMITING, UNSPECIFIED: ICD-10-CM

## 2019-07-23 ENCOUNTER — RX RENEWAL (OUTPATIENT)
Age: 59
End: 2019-07-23

## 2019-08-13 ENCOUNTER — APPOINTMENT (OUTPATIENT)
Dept: HEMATOLOGY ONCOLOGY | Facility: CLINIC | Age: 59
End: 2019-08-13
Payer: COMMERCIAL

## 2019-08-13 VITALS
DIASTOLIC BLOOD PRESSURE: 80 MMHG | WEIGHT: 226 LBS | SYSTOLIC BLOOD PRESSURE: 126 MMHG | BODY MASS INDEX: 33.37 KG/M2 | OXYGEN SATURATION: 99 % | HEART RATE: 67 BPM

## 2019-08-13 PROCEDURE — 99215 OFFICE O/P EST HI 40 MIN: CPT

## 2019-08-13 PROCEDURE — 85025 COMPLETE CBC W/AUTO DIFF WBC: CPT

## 2019-08-15 LAB
ALBUMIN SERPL ELPH-MCNC: 4.4 G/DL
ALP BLD-CCNC: 97 U/L
ALT SERPL-CCNC: 15 U/L
ANION GAP SERPL CALC-SCNC: 13 MMOL/L
AST SERPL-CCNC: 17 U/L
BILIRUB SERPL-MCNC: 0.4 MG/DL
BUN SERPL-MCNC: 20 MG/DL
CALCIUM SERPL-MCNC: 9.4 MG/DL
CHLORIDE SERPL-SCNC: 105 MMOL/L
CO2 SERPL-SCNC: 24 MMOL/L
CREAT SERPL-MCNC: 0.75 MG/DL
DEPRECATED KAPPA LC FREE/LAMBDA SER: 1.21 RATIO
GLUCOSE SERPL-MCNC: 118 MG/DL
IGA SER QL IEP: 174 MG/DL
IGG SER QL IEP: 1034 MG/DL
IGM SER QL IEP: 122 MG/DL
KAPPA LC CSF-MCNC: 1.48 MG/DL
KAPPA LC SERPL-MCNC: 1.79 MG/DL
LDH SERPL-CCNC: 179 U/L
POTASSIUM SERPL-SCNC: 4.1 MMOL/L
PROT SERPL-MCNC: 7 G/DL
SODIUM SERPL-SCNC: 142 MMOL/L
URATE SERPL-MCNC: 6.3 MG/DL

## 2019-08-15 NOTE — PHYSICAL EXAM
[Fully active, able to carry on all pre-disease performance without restriction] : Status 0 - Fully active, able to carry on all pre-disease performance without restriction [Normal] : affect appropriate [de-identified] : EOMI, anicteric sclera, conjunctiva normal [de-identified] : NICKI [de-identified] : no c/c/e

## 2019-08-15 NOTE — RESULTS/DATA
[FreeTextEntry1] : CBC done 19:\par WBC: 27.5\par A.95\par ANC: 4.26\par Hgb: 14.0\par Hct: 42.0\par Plt: 218.0

## 2019-08-15 NOTE — HISTORY OF PRESENT ILLNESS
[de-identified] :  is a 59 year old male with CLL (Diagnosed 2008, 13q del, mutated IGHV), treatment naive, who presents for his yearly follow-up. He continues to get monitoring through the [facility] clinic under the care of Dr. Shelby.\par \par Starting in  January 2019 patient has has been dealing with right epididymitis intermittently. He was on a course of Cipro and Bactrim by his urologist with some improvement, however continued to report right scrotal swelling and pain. He continued on Bactrim with some improvement. He also had a CT scan recently showing  right external  inguinal lymph node measuring 2.5 cm x 1.8 cm.\par \par He presents today for follow up. He overall feels well overall. He has been receiving IVIG every 4-6 weeks and is tolerating it. He feels the IVIG has helped with the infections. He states right epididymitis has resolved especially after first session of IVIG. He is following up with Dr. Dillon (urologist) in 9/2019.  No fevers or drenching night sweats. Good appetite. Weight overall stable. Normal bowel movements. No chest pain or shortness of breath. Energy stable. No new infections.  [de-identified] : 13qdel\par Mutated IgVH

## 2019-08-15 NOTE — ASSESSMENT
[FreeTextEntry1] : Mr. Henderson is a 59 year old male with CLL (diagnosed 2008, 13q deletion, mutated IGHV), treatment naive, who presents for follow-up. In January 2019 had history of intermittent/difficult to treat right epididymis but this has resolved after IVIG infusions were started.\par \par Plan:\par 1)CLL: no B symptoms or new lymphadenopathy, no indication at this moment for CLL-directed therapy, continue to monitor closely\par 2) Resolved right epididymitis: continue follow up with urology as indicated \par 4)continue routine follow-up at [facility] clinic as scheduled\par 5)continue routine health maintenance and age appropriate screening as indicated, discussed need for annual dermatology evaluation, Shingrix vaccine and colonoscopies as recommended\par 6) RTO in 6 months, sooner prn\par

## 2019-08-19 ENCOUNTER — APPOINTMENT (OUTPATIENT)
Dept: UROLOGY | Facility: CLINIC | Age: 59
End: 2019-08-19

## 2019-09-17 ENCOUNTER — APPOINTMENT (OUTPATIENT)
Dept: UROLOGY | Facility: CLINIC | Age: 59
End: 2019-09-17
Payer: COMMERCIAL

## 2019-09-17 VITALS
SYSTOLIC BLOOD PRESSURE: 127 MMHG | HEIGHT: 69 IN | BODY MASS INDEX: 33.33 KG/M2 | DIASTOLIC BLOOD PRESSURE: 86 MMHG | WEIGHT: 225 LBS | HEART RATE: 56 BPM | TEMPERATURE: 97 F

## 2019-09-17 DIAGNOSIS — N40.1 BENIGN PROSTATIC HYPERPLASIA WITH LOWER URINARY TRACT SYMPMS: ICD-10-CM

## 2019-09-17 DIAGNOSIS — N13.8 BENIGN PROSTATIC HYPERPLASIA WITH LOWER URINARY TRACT SYMPMS: ICD-10-CM

## 2019-09-17 DIAGNOSIS — N45.1 EPIDIDYMITIS: ICD-10-CM

## 2019-09-17 DIAGNOSIS — J45.909 UNSPECIFIED ASTHMA, UNCOMPLICATED: ICD-10-CM

## 2019-09-17 PROCEDURE — 99214 OFFICE O/P EST MOD 30 MIN: CPT

## 2019-09-17 NOTE — HISTORY OF PRESENT ILLNESS
[FreeTextEntry1] : Very pleasant 59-year-old gentleman who presents for followup of BPH with urinary obstruction, right epididymoorchitis. Patient reports resolution of right scrotal swelling and right scrotal pain. He denies dysuria. No hematuria. He reports urinary frequency but he also reports that he drinks a lot of water and coffee. No other complaints. Most recent ultrasound demonstrated resolution of epididymitis but persistent heterogeneous appearance of the right testicle. [Urinary Retention] : no urinary retention [Urinary Urgency] : no urinary urgency [Nocturia] : no nocturia [Urinary Frequency] : no urinary frequency [Weak Stream] : no weak stream [Straining] : no straining [Dysuria] : no dysuria [Hematuria - Gross] : no gross hematuria [Bladder Spasm] : no bladder spasm [Flank Pain] : no flank pain [Abdominal Pain] : no abdominal pain [Fever] : no fever [Fatigue] : no fatigue [Nausea] : no nausea

## 2019-09-17 NOTE — PHYSICAL EXAM
[General Appearance - Well Developed] : well developed [General Appearance - Well Nourished] : well nourished [Well Groomed] : well groomed [Normal Appearance] : normal appearance [General Appearance - In No Acute Distress] : no acute distress [Abdomen Soft] : soft [Abdomen Tenderness] : non-tender [Costovertebral Angle Tenderness] : no ~M costovertebral angle tenderness [Urethral Meatus] : meatus normal [Urinary Bladder Findings] : the bladder was normal on palpation [Scrotum] : the scrotum was normal [FreeTextEntry1] : resolved right scrotal swelling [Edema] : no peripheral edema [] : no respiratory distress [Respiration, Rhythm And Depth] : normal respiratory rhythm and effort [Exaggerated Use Of Accessory Muscles For Inspiration] : no accessory muscle use [Affect] : the affect was normal [Oriented To Time, Place, And Person] : oriented to person, place, and time [Not Anxious] : not anxious [Mood] : the mood was normal [Normal Station and Gait] : the gait and station were normal for the patient's age [No Palpable Adenopathy] : no palpable adenopathy [No Focal Deficits] : no focal deficits

## 2020-01-24 ENCOUNTER — APPOINTMENT (OUTPATIENT)
Dept: OTHER | Facility: CLINIC | Age: 60
End: 2020-01-24
Payer: COMMERCIAL

## 2020-01-24 ENCOUNTER — LABORATORY RESULT (OUTPATIENT)
Age: 60
End: 2020-01-24

## 2020-01-24 VITALS
RESPIRATION RATE: 18 BRPM | DIASTOLIC BLOOD PRESSURE: 70 MMHG | SYSTOLIC BLOOD PRESSURE: 120 MMHG | BODY MASS INDEX: 34.07 KG/M2 | WEIGHT: 230 LBS | HEIGHT: 69 IN | OXYGEN SATURATION: 99 % | HEART RATE: 68 BPM

## 2020-01-24 PROCEDURE — 94010 BREATHING CAPACITY TEST: CPT

## 2020-01-24 PROCEDURE — 99214 OFFICE O/P EST MOD 30 MIN: CPT | Mod: 25

## 2020-01-24 PROCEDURE — 99396 PREV VISIT EST AGE 40-64: CPT | Mod: 25

## 2020-01-24 RX ORDER — SULFAMETHOXAZOLE AND TRIMETHOPRIM 800; 160 MG/1; MG/1
800-160 TABLET ORAL TWICE DAILY
Qty: 28 | Refills: 0 | Status: COMPLETED | COMMUNITY
Start: 2019-02-01 | End: 2020-01-24

## 2020-01-24 RX ORDER — FINASTERIDE 5 MG/1
5 TABLET, FILM COATED ORAL
Qty: 30 | Refills: 5 | Status: COMPLETED | COMMUNITY
Start: 2019-01-29 | End: 2020-01-24

## 2020-01-24 RX ORDER — CIPROFLOXACIN HYDROCHLORIDE 500 MG/1
500 TABLET, FILM COATED ORAL TWICE DAILY
Qty: 20 | Refills: 0 | Status: COMPLETED | COMMUNITY
Start: 2019-02-04 | End: 2020-01-24

## 2020-01-27 LAB
ALBUMIN SERPL ELPH-MCNC: 4.3 G/DL
ALP BLD-CCNC: 99 U/L
ALT SERPL-CCNC: 14 U/L
ANION GAP SERPL CALC-SCNC: 17 MMOL/L
APPEARANCE: CLEAR
AST SERPL-CCNC: 19 U/L
BACTERIA: ABNORMAL
BASOPHILS # BLD AUTO: 0 K/UL
BASOPHILS NFR BLD AUTO: 0 %
BILIRUB SERPL-MCNC: 0.4 MG/DL
BILIRUBIN URINE: NEGATIVE
BLOOD URINE: NEGATIVE
BUN SERPL-MCNC: 16 MG/DL
CALCIUM SERPL-MCNC: 9.9 MG/DL
CHLORIDE SERPL-SCNC: 101 MMOL/L
CHOLEST SERPL-MCNC: 188 MG/DL
CHOLEST/HDLC SERPL: 2.9 RATIO
CO2 SERPL-SCNC: 24 MMOL/L
COLOR: NORMAL
CREAT SERPL-MCNC: 0.81 MG/DL
EOSINOPHIL # BLD AUTO: 0 K/UL
EOSINOPHIL NFR BLD AUTO: 0 %
GLUCOSE QUALITATIVE U: NEGATIVE
GLUCOSE SERPL-MCNC: 71 MG/DL
HCT VFR BLD CALC: 43 %
HDLC SERPL-MCNC: 64 MG/DL
HGB BLD-MCNC: 13.8 G/DL
HYALINE CASTS: 0 /LPF
KETONES URINE: NEGATIVE
LDLC SERPL CALC-MCNC: 109 MG/DL
LEUKOCYTE ESTERASE URINE: ABNORMAL
LYMPHOCYTES # BLD AUTO: 26.62 K/UL
LYMPHOCYTES NFR BLD AUTO: 91 %
MAN DIFF?: NORMAL
MCHC RBC-ENTMCNC: 29.4 PG
MCHC RBC-ENTMCNC: 32.1 GM/DL
MCV RBC AUTO: 91.7 FL
MICROSCOPIC-UA: NORMAL
MONOCYTES # BLD AUTO: 0.29 K/UL
MONOCYTES NFR BLD AUTO: 1 %
NEUTROPHILS # BLD AUTO: 2.34 K/UL
NEUTROPHILS NFR BLD AUTO: 8 %
NITRITE URINE: NEGATIVE
PH URINE: 6
PLATELET # BLD AUTO: 241 K/UL
POTASSIUM SERPL-SCNC: 4.2 MMOL/L
PROT SERPL-MCNC: 7 G/DL
PROTEIN URINE: NEGATIVE
RBC # BLD: 4.69 M/UL
RBC # FLD: 13 %
RED BLOOD CELLS URINE: 4 /HPF
SODIUM SERPL-SCNC: 142 MMOL/L
SPECIFIC GRAVITY URINE: 1.01
SQUAMOUS EPITHELIAL CELLS: 4 /HPF
TRIGL SERPL-MCNC: 73 MG/DL
URATE SERPL-MCNC: 5.8 MG/DL
UROBILINOGEN URINE: NORMAL
WBC # FLD AUTO: 29.25 K/UL
WHITE BLOOD CELLS URINE: 38 /HPF

## 2020-02-25 ENCOUNTER — APPOINTMENT (OUTPATIENT)
Dept: HEMATOLOGY ONCOLOGY | Facility: CLINIC | Age: 60
End: 2020-02-25
Payer: COMMERCIAL

## 2020-02-25 ENCOUNTER — LABORATORY RESULT (OUTPATIENT)
Age: 60
End: 2020-02-25

## 2020-02-25 VITALS
SYSTOLIC BLOOD PRESSURE: 140 MMHG | BODY MASS INDEX: 34.56 KG/M2 | WEIGHT: 234 LBS | DIASTOLIC BLOOD PRESSURE: 80 MMHG | HEART RATE: 82 BPM | OXYGEN SATURATION: 98 %

## 2020-02-25 PROCEDURE — 85025 COMPLETE CBC W/AUTO DIFF WBC: CPT

## 2020-02-25 PROCEDURE — 99215 OFFICE O/P EST HI 40 MIN: CPT

## 2020-02-26 LAB
ALBUMIN SERPL ELPH-MCNC: 4.2 G/DL
ALP BLD-CCNC: 101 U/L
ALT SERPL-CCNC: 15 U/L
ANION GAP SERPL CALC-SCNC: 12 MMOL/L
AST SERPL-CCNC: 16 U/L
BILIRUB SERPL-MCNC: 0.3 MG/DL
BUN SERPL-MCNC: 18 MG/DL
CALCIUM SERPL-MCNC: 9.4 MG/DL
CHLORIDE SERPL-SCNC: 106 MMOL/L
CO2 SERPL-SCNC: 23 MMOL/L
CREAT SERPL-MCNC: 0.73 MG/DL
GLUCOSE SERPL-MCNC: 100 MG/DL
LDH SERPL-CCNC: 168 U/L
MAGNESIUM SERPL-MCNC: 2 MG/DL
PHOSPHATE SERPL-MCNC: 3.2 MG/DL
POTASSIUM SERPL-SCNC: 4.3 MMOL/L
PROT SERPL-MCNC: 6.7 G/DL
SODIUM SERPL-SCNC: 141 MMOL/L
URATE SERPL-MCNC: 5.3 MG/DL

## 2020-03-04 LAB
ALBUMIN MFR SERPL ELPH: 59 %
ALBUMIN SERPL-MCNC: 4 G/DL
ALBUMIN/GLOB SERPL: 1.5 RATIO
ALPHA1 GLOB MFR SERPL ELPH: 4.4 %
ALPHA1 GLOB SERPL ELPH-MCNC: 0.3 G/DL
ALPHA2 GLOB MFR SERPL ELPH: 11.1 %
ALPHA2 GLOB SERPL ELPH-MCNC: 0.7 G/DL
B-GLOBULIN MFR SERPL ELPH: 12 %
B-GLOBULIN SERPL ELPH-MCNC: 0.8 G/DL
BASOPHILS # BLD AUTO: 0.09 K/UL
BASOPHILS NFR BLD AUTO: 0.3 %
EOSINOPHIL # BLD AUTO: 0.13 K/UL
EOSINOPHIL NFR BLD AUTO: 0.4 %
GAMMA GLOB FLD ELPH-MCNC: 0.9 G/DL
GAMMA GLOB MFR SERPL ELPH: 13.5 %
HCT VFR BLD CALC: 43 %
HGB BLD-MCNC: 14.1 G/DL
IMM GRANULOCYTES NFR BLD AUTO: 0.2 %
INTERPRETATION SERPL IEP-IMP: NORMAL
LYMPHOCYTES # BLD AUTO: 24.95 K/UL
LYMPHOCYTES NFR BLD AUTO: 86 %
MAN DIFF?: NORMAL
MCHC RBC-ENTMCNC: 29.4 PG
MCHC RBC-ENTMCNC: 32.8 GM/DL
MCV RBC AUTO: 89.8 FL
MONOCYTES # BLD AUTO: 0.4 K/UL
MONOCYTES NFR BLD AUTO: 1.4 %
NEUTROPHILS # BLD AUTO: 3.38 K/UL
NEUTROPHILS NFR BLD AUTO: 11.7 %
PLATELET # BLD AUTO: 218 K/UL
PROT SERPL-MCNC: 6.7 G/DL
RBC # BLD: 4.79 M/UL
RBC # FLD: 12.9 %
WBC # FLD AUTO: 29.01 K/UL

## 2020-03-23 NOTE — ASSESSMENT
[FreeTextEntry1] : Mr. Henderson is a 59 year old male with CLL (diagnosed 2008, 13q deletion, mutated IGHV), treatment naive, who presents for follow-up. In January 2019 had history of intermittent/difficult to treat right epididymis but this has resolved after IVIG infusions were started.\par \par Plan:\par 1)CLL: no B symptoms or new lymphadenopathy, no indication at this moment for CLL-directed therapy, continue to monitor closely. CBC reviewed from 1/24/20 is stable with WBC of 29.25K, no anemia or thrombocytopenia\par 2) Resolved right epididymitis: continue follow up with urology as indicated \par 4)continue routine follow-up at [facility] clinic as scheduled\par 5)continue routine health maintenance and age appropriate screening as indicated, discussed need for annual dermatology evaluation, Shingrix vaccine and colonoscopies as recommended\par 6) RTO in 6 months, sooner prn\par

## 2020-03-23 NOTE — RESULTS/DATA
[FreeTextEntry1] : CBC done 2/25/20 was sent out to lab for processing, in house machine was not working

## 2020-03-23 NOTE — HISTORY OF PRESENT ILLNESS
[de-identified] :  is a 59 year old male with CLL (Diagnosed 2008, 13q del, mutated IGHV), treatment naive, who presents for his yearly follow-up. He continues to get monitoring through the [facility] clinic under the care of Dr. Shelby.\par \par Starting in  January 2019 patient has has been dealing with right epididymitis intermittently. He was on a course of Cipro and Bactrim by his urologist with some improvement, however continued to report right scrotal swelling and pain. He continued on Bactrim with some improvement. He also had a CT scan recently showing  right external  inguinal lymph node measuring 2.5 cm x 1.8 cm.\par \par He presents today for follow up. He overall feels well overall. He has been receiving IVIG every 4-6 weeks and is tolerating it. He feels the IVIG has helped with the infections. He states right epididymitis has resolved especially after first session of IVIG. He is following up with Dr. Dillon (urologist).  No fevers or drenching night sweats. Good appetite. Weight overall stable. Normal bowel movements. No chest pain or shortness of breath. Energy stable. No new infections.  [de-identified] : 13qdel\par Mutated IgVH

## 2020-03-23 NOTE — PHYSICAL EXAM
[Fully active, able to carry on all pre-disease performance without restriction] : Status 0 - Fully active, able to carry on all pre-disease performance without restriction [Normal] : affect appropriate [de-identified] : EOMI, anicteric sclera, conjunctiva normal [de-identified] : NICKI [de-identified] : no c/c/e

## 2020-03-24 ENCOUNTER — TRANSCRIPTION ENCOUNTER (OUTPATIENT)
Age: 60
End: 2020-03-24

## 2020-05-13 ENCOUNTER — APPOINTMENT (OUTPATIENT)
Dept: GASTROENTEROLOGY | Facility: CLINIC | Age: 60
End: 2020-05-13
Payer: COMMERCIAL

## 2020-05-13 DIAGNOSIS — Z12.11 ENCOUNTER FOR SCREENING FOR MALIGNANT NEOPLASM OF COLON: ICD-10-CM

## 2020-05-13 PROCEDURE — 99203 OFFICE O/P NEW LOW 30 MIN: CPT | Mod: 95

## 2020-05-13 NOTE — ASSESSMENT
[FreeTextEntry1] : 59 yr male, ave risk for colon ca and polyps\par \par Plan\par \par Indications risks benefits and alternatives to colonoscopy were reviewed.  Patient agrees.

## 2020-05-13 NOTE — PHYSICAL EXAM
[General Appearance - Alert] : alert [General Appearance - In No Acute Distress] : in no acute distress [Impaired Insight] : insight and judgment were intact [Oriented To Time, Place, And Person] : oriented to person, place, and time [Affect] : the affect was normal

## 2020-05-13 NOTE — HISTORY OF PRESENT ILLNESS
[de-identified] : Telehealth Visit\par Patient Consents\par Patient at home\par MD at [address], [place], [place]\par \par \par 59 yr male, denies family history of colon ca, eval prior to screening colonoscopy\par Last exam ( his second) 5 yrs ago\par normal\par No interval complaints\par Rare reflux, no medication use\par \par Denies CAD, CHF, dysrhythmia  (saw a cardiologist years ago for reasons of FAMILY history, never personal suspicion for cardiac disese)\par \par  Does infection control training\par

## 2020-05-15 ENCOUNTER — APPOINTMENT (OUTPATIENT)
Dept: GASTROENTEROLOGY | Facility: CLINIC | Age: 60
End: 2020-05-15

## 2020-07-23 ENCOUNTER — LABORATORY RESULT (OUTPATIENT)
Age: 60
End: 2020-07-23

## 2020-07-24 LAB
ALBUMIN SERPL ELPH-MCNC: 4.5 G/DL
ALP BLD-CCNC: 105 U/L
ALT SERPL-CCNC: 17 U/L
ANION GAP SERPL CALC-SCNC: 13 MMOL/L
AST SERPL-CCNC: 19 U/L
BASOPHILS # BLD AUTO: 0.3 K/UL
BASOPHILS NFR BLD AUTO: 0.9 %
BILIRUB SERPL-MCNC: 0.2 MG/DL
BUN SERPL-MCNC: 19 MG/DL
CALCIUM SERPL-MCNC: 10.1 MG/DL
CHLORIDE SERPL-SCNC: 105 MMOL/L
CO2 SERPL-SCNC: 26 MMOL/L
CREAT SERPL-MCNC: 0.98 MG/DL
EOSINOPHIL # BLD AUTO: 0 K/UL
EOSINOPHIL NFR BLD AUTO: 0 %
GLUCOSE SERPL-MCNC: 89 MG/DL
HCT VFR BLD CALC: 44.7 %
HGB BLD-MCNC: 13.8 G/DL
LDH SERPL-CCNC: 186 U/L
LYMPHOCYTES # BLD AUTO: 21.56 K/UL
LYMPHOCYTES NFR BLD AUTO: 64.9 %
MAN DIFF?: NORMAL
MCHC RBC-ENTMCNC: 29.4 PG
MCHC RBC-ENTMCNC: 30.9 GM/DL
MCV RBC AUTO: 95.1 FL
MONOCYTES # BLD AUTO: 1.2 K/UL
MONOCYTES NFR BLD AUTO: 3.6 %
NEUTROPHILS # BLD AUTO: 7.47 K/UL
NEUTROPHILS NFR BLD AUTO: 22.5 %
PLATELET # BLD AUTO: 254 K/UL
POTASSIUM SERPL-SCNC: 5.1 MMOL/L
PROT SERPL-MCNC: 7 G/DL
RBC # BLD: 4.7 M/UL
RBC # FLD: 13.1 %
SODIUM SERPL-SCNC: 144 MMOL/L
URATE SERPL-MCNC: 5.7 MG/DL
WBC # FLD AUTO: 33.22 K/UL

## 2020-07-28 ENCOUNTER — APPOINTMENT (OUTPATIENT)
Dept: HEMATOLOGY ONCOLOGY | Facility: CLINIC | Age: 60
End: 2020-07-28
Payer: COMMERCIAL

## 2020-07-28 PROCEDURE — 99215 OFFICE O/P EST HI 40 MIN: CPT | Mod: 95

## 2020-07-29 NOTE — ASSESSMENT
[FreeTextEntry1] : Mr. Henderson is a 59 year old male with CLL (diagnosed 2008, 13q deletion, mutated IGHV), treatment naive, who presents for follow-up, no new complaints. In January 2019 had history of intermittent/difficult to treat right epididymis, resolved-asked him to call  to see if maybe he would need f/u.\par \par Plan:\par 1)CLL: no B symptoms or new lymphadenopathy, no indication at this moment for CLL-directed therapy, WBC stable at 33k, no anemia or thrombocytopenia\par 2) Resolved right epididymitis: continue follow up with urology as indicated \par 4)continue routine follow-up at [facility] clinic as scheduled\par 5)continue routine health maintenance and age appropriate screening as indicated, discussed need for annual dermatology evaluation, Shingrix vaccine and colonoscopies as recommended\par 6) RTO in 6 months, sooner prn\par

## 2020-07-29 NOTE — PHYSICAL EXAM
[Fully active, able to carry on all pre-disease performance without restriction] : Status 0 - Fully active, able to carry on all pre-disease performance without restriction [Normal] : affect appropriate [de-identified] : EOMI, anicteric sclera, conjunctiva normal [de-identified] : NICKI [de-identified] : no c/c/e

## 2020-07-29 NOTE — HISTORY OF PRESENT ILLNESS
[Home] : at home, [unfilled] , at the time of the visit. [Medical Office: ([facility])___] : at the medical office located in  [Verbal consent obtained from patient] : the patient, [unfilled] [de-identified] :  is a 60 year old male with CLL (Diagnosed 2008, 13q del, mutated IGHV), treatment naive, who presents for his yearly follow-up. He continues to get monitoring through the [facility] clinic under the care of Dr. Shelby. Last year the patient had issues of right epididymitis intermittently, treated with Cipro and Bactrim by his urologist with resolution. I mentioned to him that his last ultrasound April 2019 suggested a repeat US in 2-3 months to monitor stability of some findings (see scanned US April 2019) and he didn't have a repeat US--would ask  to make sure there is no need for repeat testing--the last note from urologist recommended follow-up in 3 months but he hasn't seen him since April 2019.\par \par He presents today for follow up. He overall feels well overall. No fevers or drenching night sweats. Good appetite. Weight overall stable. Normal bowel movements. No chest pain or shortness of breath. Energy stable. No new infections. He's been working from home due to COVID pandemic. [de-identified] : 13qdel\par Mutated IgVH

## 2020-08-12 ENCOUNTER — FORM ENCOUNTER (OUTPATIENT)
Age: 60
End: 2020-08-12

## 2020-08-15 DIAGNOSIS — Z01.818 ENCOUNTER FOR OTHER PREPROCEDURAL EXAMINATION: ICD-10-CM

## 2020-08-16 ENCOUNTER — APPOINTMENT (OUTPATIENT)
Dept: DISASTER EMERGENCY | Facility: CLINIC | Age: 60
End: 2020-08-16

## 2020-08-17 LAB — SARS-COV-2 N GENE NPH QL NAA+PROBE: NOT DETECTED

## 2020-08-19 ENCOUNTER — APPOINTMENT (OUTPATIENT)
Dept: GASTROENTEROLOGY | Facility: AMBULATORY MEDICAL SERVICES | Age: 60
End: 2020-08-19
Payer: COMMERCIAL

## 2020-08-19 PROCEDURE — 45380 COLONOSCOPY AND BIOPSY: CPT | Mod: 33

## 2020-10-15 ENCOUNTER — RX RENEWAL (OUTPATIENT)
Age: 60
End: 2020-10-15

## 2020-12-21 PROBLEM — N39.0 ACUTE UTI: Status: RESOLVED | Noted: 2019-02-01 | Resolved: 2020-12-21

## 2020-12-30 ENCOUNTER — LABORATORY RESULT (OUTPATIENT)
Age: 60
End: 2020-12-30

## 2021-01-05 LAB
ALBUMIN SERPL ELPH-MCNC: 4.5 G/DL
ALP BLD-CCNC: 106 U/L
ALT SERPL-CCNC: 20 U/L
ANION GAP SERPL CALC-SCNC: 12 MMOL/L
APPEARANCE: CLEAR
AST SERPL-CCNC: 20 U/L
BACTERIA: NEGATIVE
BASOPHILS # BLD AUTO: 0 K/UL
BASOPHILS NFR BLD AUTO: 0 %
BILIRUB SERPL-MCNC: 0.4 MG/DL
BILIRUBIN URINE: NEGATIVE
BLOOD URINE: NEGATIVE
BUN SERPL-MCNC: 13 MG/DL
CALCIUM SERPL-MCNC: 9.5 MG/DL
CHLORIDE SERPL-SCNC: 103 MMOL/L
CHOLEST SERPL-MCNC: 182 MG/DL
CO2 SERPL-SCNC: 25 MMOL/L
COLOR: COLORLESS
CREAT SERPL-MCNC: 0.96 MG/DL
EOSINOPHIL # BLD AUTO: 0 K/UL
EOSINOPHIL NFR BLD AUTO: 0 %
GLUCOSE QUALITATIVE U: NEGATIVE
GLUCOSE SERPL-MCNC: 89 MG/DL
HCT VFR BLD CALC: 42.1 %
HDLC SERPL-MCNC: 62 MG/DL
HGB BLD-MCNC: 13.7 G/DL
HYALINE CASTS: 0 /LPF
KETONES URINE: NEGATIVE
LDLC SERPL CALC-MCNC: 98 MG/DL
LEUKOCYTE ESTERASE URINE: NEGATIVE
LYMPHOCYTES # BLD AUTO: 29.01 K/UL
LYMPHOCYTES NFR BLD AUTO: 89 %
MAN DIFF?: NORMAL
MCHC RBC-ENTMCNC: 29.5 PG
MCHC RBC-ENTMCNC: 32.5 GM/DL
MCV RBC AUTO: 90.7 FL
MICROSCOPIC-UA: NORMAL
MONOCYTES # BLD AUTO: 0.65 K/UL
MONOCYTES NFR BLD AUTO: 2 %
NEUTROPHILS # BLD AUTO: 2.93 K/UL
NEUTROPHILS NFR BLD AUTO: 9 %
NITRITE URINE: NEGATIVE
NONHDLC SERPL-MCNC: 119 MG/DL
PH URINE: 6.5
PLATELET # BLD AUTO: 241 K/UL
POTASSIUM SERPL-SCNC: 4.4 MMOL/L
PROT SERPL-MCNC: 6.7 G/DL
PROTEIN URINE: NEGATIVE
RBC # BLD: 4.64 M/UL
RBC # FLD: 13.2 %
RED BLOOD CELLS URINE: 0 /HPF
SODIUM SERPL-SCNC: 140 MMOL/L
SPECIFIC GRAVITY URINE: 1.01
SQUAMOUS EPITHELIAL CELLS: 0 /HPF
TRIGL SERPL-MCNC: 108 MG/DL
UROBILINOGEN URINE: NORMAL
WBC # FLD AUTO: 32.6 K/UL
WHITE BLOOD CELLS URINE: 0 /HPF

## 2021-01-12 ENCOUNTER — APPOINTMENT (OUTPATIENT)
Dept: HEMATOLOGY ONCOLOGY | Facility: CLINIC | Age: 61
End: 2021-01-12
Payer: COMMERCIAL

## 2021-01-12 PROCEDURE — 99215 OFFICE O/P EST HI 40 MIN: CPT | Mod: 95

## 2021-01-13 ENCOUNTER — APPOINTMENT (OUTPATIENT)
Dept: OTHER | Facility: CLINIC | Age: 61
End: 2021-01-13
Payer: COMMERCIAL

## 2021-01-13 PROCEDURE — 99442: CPT | Mod: 95

## 2021-01-13 PROCEDURE — 99396 PREV VISIT EST AGE 40-64: CPT | Mod: 95

## 2021-01-13 RX ORDER — ELASTIC BANDAGE 1"X2.2YD
2300-700 BANDAGE TOPICAL
Qty: 1 | Refills: 3 | Status: COMPLETED | COMMUNITY
Start: 2020-01-24 | End: 2021-01-13

## 2021-01-13 RX ORDER — SODIUM SULFATE, POTASSIUM SULFATE, MAGNESIUM SULFATE 17.5; 3.13; 1.6 G/ML; G/ML; G/ML
17.5-3.13-1.6 SOLUTION, CONCENTRATE ORAL
Qty: 1 | Refills: 0 | Status: COMPLETED | COMMUNITY
Start: 2020-05-13 | End: 2020-07-28

## 2021-01-13 NOTE — PHYSICAL EXAM
[Fully active, able to carry on all pre-disease performance without restriction] : Status 0 - Fully active, able to carry on all pre-disease performance without restriction [Normal] : affect appropriate [de-identified] : EOMI, anicteric sclera, conjunctiva normal

## 2021-01-13 NOTE — HISTORY OF PRESENT ILLNESS
[Home] : at home, [unfilled] , at the time of the visit. [Medical Office: ([facility])___] : at the medical office located in  [Verbal consent obtained from patient] : the patient, [unfilled] [de-identified] :  is a 60 year old male with CLL (Diagnosed 2008, 13q del, mutated IGHV), treatment naive, who presents for his yearly follow-up. He continues to get monitoring through the [facility] clinic under the care of Dr. Shelby.\par \par He presents today for follow up. He overall feels well overall. No fevers or drenching night sweats. Good appetite. Weight overall stable. Normal bowel movements. No chest pain or shortness of breath. Energy stable. No new infections. He's been working from home due to COVID pandemic. We discussed the COVID vaccine and our recommendation to get it if he gets it offered. [de-identified] : 13qdel\par Mutated IgVH

## 2021-01-13 NOTE — ASSESSMENT
[FreeTextEntry1] : Mr. Henderson is a 60 year old male with CLL (diagnosed 2008, 13q deletion, mutated IGHV), treatment naive, who presents for follow-up, no new complaints. No evidence of disease progression \par \par Plan:\par 1)CLL: no B symptoms or new lymphadenopathy, no indication at this moment for CLL-directed therapy, WBC stable, no anemia or thrombocytopenia\par 2)RCHM: continue routine follow-up at [facility] clinic as scheduled. continue routine health maintenance and age appropriate screening as indicated, discussed need for annual dermatology evaluation, COVID vaccine and colonoscopies as recommended\par 3) RTO in 6 months, sooner prn\par

## 2021-01-19 ENCOUNTER — FORM ENCOUNTER (OUTPATIENT)
Age: 61
End: 2021-01-19

## 2021-01-25 ENCOUNTER — OUTPATIENT (OUTPATIENT)
Dept: OUTPATIENT SERVICES | Facility: HOSPITAL | Age: 61
LOS: 1 days | End: 2021-01-25
Payer: COMMERCIAL

## 2021-01-25 ENCOUNTER — APPOINTMENT (OUTPATIENT)
Dept: RADIOLOGY | Facility: CLINIC | Age: 61
End: 2021-01-25
Payer: COMMERCIAL

## 2021-01-25 DIAGNOSIS — Z04.9 ENCOUNTER FOR EXAMINATION AND OBSERVATION FOR UNSPECIFIED REASON: ICD-10-CM

## 2021-01-25 PROCEDURE — 71046 X-RAY EXAM CHEST 2 VIEWS: CPT

## 2021-01-25 PROCEDURE — 71046 X-RAY EXAM CHEST 2 VIEWS: CPT | Mod: 26

## 2021-08-08 ENCOUNTER — FORM ENCOUNTER (OUTPATIENT)
Age: 61
End: 2021-08-08

## 2021-08-25 ENCOUNTER — APPOINTMENT (OUTPATIENT)
Dept: HEMATOLOGY ONCOLOGY | Facility: CLINIC | Age: 61
End: 2021-08-25
Payer: COMMERCIAL

## 2021-08-25 VITALS
BODY MASS INDEX: 32.93 KG/M2 | DIASTOLIC BLOOD PRESSURE: 82 MMHG | RESPIRATION RATE: 18 BRPM | OXYGEN SATURATION: 99 % | WEIGHT: 223 LBS | SYSTOLIC BLOOD PRESSURE: 128 MMHG | TEMPERATURE: 98.5 F | HEART RATE: 78 BPM

## 2021-08-25 PROCEDURE — 85025 COMPLETE CBC W/AUTO DIFF WBC: CPT

## 2021-08-25 PROCEDURE — 99215 OFFICE O/P EST HI 40 MIN: CPT

## 2021-08-29 NOTE — HISTORY OF PRESENT ILLNESS
[de-identified] :  is a 61 year old male with CLL (Diagnosed 2008, 13q del, mutated IGHV), treatment naive, who presents for his yearly follow-up. He continues to get monitoring through the [facility] clinic under the care of Dr. Shelby.\par \par He presents today for follow up. He overall feels well overall. No fevers or drenching night sweats. Good appetite. Weight overall stable. Normal bowel movements. No chest pain or shortness of breath. Energy stable. No new infections.  [de-identified] : 13qdel\par Mutated IgVH

## 2021-08-29 NOTE — PHYSICAL EXAM
[Fully active, able to carry on all pre-disease performance without restriction] : Status 0 - Fully active, able to carry on all pre-disease performance without restriction [Normal] : no peripheral adenopathy appreciated [de-identified] : EOMI, anicteric sclera, conjunctiva normal

## 2021-08-29 NOTE — RESULTS/DATA
[FreeTextEntry1] : CBC with differential 8/25/21\par WBC 27.6\par ALC 22.56\par ANC 4.00\par Hgb 14.0\par Hct 42.8\par Plt 253.0

## 2021-08-29 NOTE — ASSESSMENT
[FreeTextEntry1] : Mr. Henderson is a 61 year old male with CLL (diagnosed 2008, 13q deletion, mutated IGHV), treatment naive, who presents for follow-up, no new complaints. No evidence of disease progression, no B sx, no LAD or organomegaly. We can continue with yearly follow-ups.\par \par Plan:\par 1)CLL: no B symptoms or new lymphadenopathy, no indication at this moment for CLL-directed therapy, WBC stable, no anemia or thrombocytopenia\par 2)RCHM: continue routine follow-up at [facility] clinic as scheduled. continue routine health maintenance and age appropriate screening as indicated, discussed need for annual dermatology evaluation, COVID vaccine and colonoscopies as recommended\par 3) RTO in 6-12 months, sooner prn\par

## 2021-09-01 LAB
ALBUMIN SERPL ELPH-MCNC: 4.3 G/DL
ALP BLD-CCNC: 103 U/L
ALT SERPL-CCNC: 19 U/L
ANION GAP SERPL CALC-SCNC: 11 MMOL/L
AST SERPL-CCNC: 22 U/L
BILIRUB SERPL-MCNC: 0.4 MG/DL
BUN SERPL-MCNC: 16 MG/DL
CALCIUM SERPL-MCNC: 9.5 MG/DL
CHLORIDE SERPL-SCNC: 107 MMOL/L
CO2 SERPL-SCNC: 23 MMOL/L
COVID-19 SPIKE DOMAIN ANTIBODY INTERPRETATION: POSITIVE
CREAT SERPL-MCNC: 0.83 MG/DL
GLUCOSE SERPL-MCNC: 86 MG/DL
LDH SERPL-CCNC: 174 U/L
MAGNESIUM SERPL-MCNC: 2.2 MG/DL
PHOSPHATE SERPL-MCNC: 3.4 MG/DL
POTASSIUM SERPL-SCNC: 4.2 MMOL/L
PROT SERPL-MCNC: 6.7 G/DL
SARS-COV-2 AB SERPL IA-ACNC: >250 U/ML
SODIUM SERPL-SCNC: 141 MMOL/L
URATE SERPL-MCNC: 6.2 MG/DL

## 2021-12-27 ENCOUNTER — NON-APPOINTMENT (OUTPATIENT)
Age: 61
End: 2021-12-27

## 2022-01-24 ENCOUNTER — APPOINTMENT (OUTPATIENT)
Dept: OTHER | Facility: CLINIC | Age: 62
End: 2022-01-24
Payer: COMMERCIAL

## 2022-01-24 DIAGNOSIS — Z87.891 PERSONAL HISTORY OF NICOTINE DEPENDENCE: ICD-10-CM

## 2022-01-24 PROCEDURE — 99396 PREV VISIT EST AGE 40-64: CPT | Mod: 95

## 2022-01-24 PROCEDURE — 99442: CPT | Mod: 95

## 2022-01-24 RX ORDER — ZINC SULFATE 50(220)MG
CAPSULE ORAL
Refills: 0 | Status: ACTIVE | COMMUNITY

## 2022-01-28 ENCOUNTER — LABORATORY RESULT (OUTPATIENT)
Age: 62
End: 2022-01-28

## 2022-01-31 LAB
ALBUMIN SERPL ELPH-MCNC: 4.5 G/DL
ALP BLD-CCNC: 104 U/L
ALT SERPL-CCNC: 17 U/L
ANION GAP SERPL CALC-SCNC: 16 MMOL/L
APPEARANCE: CLEAR
AST SERPL-CCNC: 17 U/L
BACTERIA: NEGATIVE
BASOPHILS # BLD AUTO: 0 K/UL
BASOPHILS NFR BLD AUTO: 0 %
BILIRUB SERPL-MCNC: 0.4 MG/DL
BILIRUBIN URINE: NEGATIVE
BLOOD URINE: NEGATIVE
BUN SERPL-MCNC: 17 MG/DL
CALCIUM SERPL-MCNC: 9.5 MG/DL
CHLORIDE SERPL-SCNC: 103 MMOL/L
CHOLEST SERPL-MCNC: 195 MG/DL
CO2 SERPL-SCNC: 23 MMOL/L
COLOR: NORMAL
CREAT SERPL-MCNC: 0.85 MG/DL
EOSINOPHIL # BLD AUTO: 0 K/UL
EOSINOPHIL NFR BLD AUTO: 0 %
GLUCOSE QUALITATIVE U: NEGATIVE
GLUCOSE SERPL-MCNC: 86 MG/DL
HCT VFR BLD CALC: 41.1 %
HDLC SERPL-MCNC: 64 MG/DL
HGB BLD-MCNC: 13.2 G/DL
HYALINE CASTS: 0 /LPF
KETONES URINE: NEGATIVE
LDLC SERPL CALC-MCNC: 105 MG/DL
LEUKOCYTE ESTERASE URINE: NEGATIVE
LYMPHOCYTES # BLD AUTO: 26.02 K/UL
LYMPHOCYTES NFR BLD AUTO: 84 %
MAN DIFF?: NORMAL
MCHC RBC-ENTMCNC: 29.5 PG
MCHC RBC-ENTMCNC: 32.1 GM/DL
MCV RBC AUTO: 91.9 FL
MICROSCOPIC-UA: NORMAL
MONOCYTES # BLD AUTO: 0.62 K/UL
MONOCYTES NFR BLD AUTO: 2 %
NEUTROPHILS # BLD AUTO: 3.1 K/UL
NEUTROPHILS NFR BLD AUTO: 10 %
NITRITE URINE: NEGATIVE
NONHDLC SERPL-MCNC: 131 MG/DL
PH URINE: 6
PLATELET # BLD AUTO: 221 K/UL
POTASSIUM SERPL-SCNC: 4.3 MMOL/L
PROT SERPL-MCNC: 6.8 G/DL
PROTEIN URINE: NEGATIVE
RBC # BLD: 4.47 M/UL
RBC # FLD: 13.5 %
RED BLOOD CELLS URINE: 1 /HPF
SODIUM SERPL-SCNC: 142 MMOL/L
SPECIFIC GRAVITY URINE: 1.01
SQUAMOUS EPITHELIAL CELLS: 0 /HPF
TRIGL SERPL-MCNC: 129 MG/DL
UROBILINOGEN URINE: NORMAL
WBC # FLD AUTO: 30.98 K/UL
WHITE BLOOD CELLS URINE: 2 /HPF

## 2022-05-13 ENCOUNTER — RX RENEWAL (OUTPATIENT)
Age: 62
End: 2022-05-13

## 2022-06-23 ENCOUNTER — NON-APPOINTMENT (OUTPATIENT)
Age: 62
End: 2022-06-23

## 2022-08-11 ENCOUNTER — NON-APPOINTMENT (OUTPATIENT)
Age: 62
End: 2022-08-11

## 2022-08-24 ENCOUNTER — APPOINTMENT (OUTPATIENT)
Dept: HEMATOLOGY ONCOLOGY | Facility: CLINIC | Age: 62
End: 2022-08-24

## 2022-08-24 PROCEDURE — 85025 COMPLETE CBC W/AUTO DIFF WBC: CPT

## 2022-08-24 PROCEDURE — 99213 OFFICE O/P EST LOW 20 MIN: CPT

## 2022-08-25 LAB
ALBUMIN SERPL ELPH-MCNC: 4.1 G/DL
ALP BLD-CCNC: 107 U/L
ALT SERPL-CCNC: 16 U/L
ANION GAP SERPL CALC-SCNC: 11 MMOL/L
AST SERPL-CCNC: 20 U/L
BILIRUB SERPL-MCNC: 0.4 MG/DL
BUN SERPL-MCNC: 17 MG/DL
CALCIUM SERPL-MCNC: 9.5 MG/DL
CHLORIDE SERPL-SCNC: 105 MMOL/L
CO2 SERPL-SCNC: 24 MMOL/L
CREAT SERPL-MCNC: 0.8 MG/DL
EGFR: 100 ML/MIN/1.73M2
GLUCOSE SERPL-MCNC: 97 MG/DL
LDH SERPL-CCNC: 177 U/L
MAGNESIUM SERPL-MCNC: 2.1 MG/DL
PHOSPHATE SERPL-MCNC: 3.3 MG/DL
POTASSIUM SERPL-SCNC: 4.3 MMOL/L
PROT SERPL-MCNC: 6.6 G/DL
SODIUM SERPL-SCNC: 140 MMOL/L
URATE SERPL-MCNC: 5.5 MG/DL

## 2022-08-29 NOTE — ASSESSMENT
[FreeTextEntry1] : Mr. Henderson is a 62 year old male with CLL (diagnosed 2008, 13q deletion, mutated IGHV), treatment naive, who presents for follow-up, no new complaints. No evidence of disease progression, no B sx, no LAD or organomegaly. We can continue with yearly follow-ups.\par \par Plan:\par 1)CLL: no B symptoms or new lymphadenopathy, no indication at this moment for CLL-directed therapy, WBC stable, no anemia or thrombocytopenia\par 2)RCHM: continue routine follow-up at [facility] clinic as scheduled. continue routine health maintenance and age appropriate screening as indicated, discussed need for annual dermatology evaluation, COVID vaccine and colonoscopies as recommended\par 3) RTO in 6-12 months, sooner prn\par

## 2022-08-29 NOTE — HISTORY OF PRESENT ILLNESS
[de-identified] :  is a 62 year old male with CLL (Diagnosed 2008, 13q del, mutated IGHV), treatment naive, who presents for his yearly follow-up. He continues to get monitoring through the [facility] clinic under the care of Dr. Shelby.\par \par He presents today for follow up. He overall feels well overall. No fevers or drenching night sweats. Good appetite. Weight overall stable. Normal bowel movements. No chest pain or shortness of breath. Energy stable. No new infections.  [de-identified] : 13qdel\par Mutated IgVH

## 2022-08-29 NOTE — RESULTS/DATA
[FreeTextEntry1] : CBC with differential 8/24/22\par WBC 32.6\par ALC 26.92\par ANC 4.05\par Hgb 13.9\par Hct 41.2\par Plt 220

## 2022-08-29 NOTE — PHYSICAL EXAM
[Fully active, able to carry on all pre-disease performance without restriction] : Status 0 - Fully active, able to carry on all pre-disease performance without restriction [Normal] : grossly intact [de-identified] : EOMI, anicteric sclera, conjunctiva normal

## 2023-01-03 ENCOUNTER — RX RENEWAL (OUTPATIENT)
Age: 63
End: 2023-01-03

## 2023-01-05 ENCOUNTER — LABORATORY RESULT (OUTPATIENT)
Age: 63
End: 2023-01-05

## 2023-01-05 ENCOUNTER — APPOINTMENT (OUTPATIENT)
Dept: OTHER | Facility: CLINIC | Age: 63
End: 2023-01-05
Payer: COMMERCIAL

## 2023-01-05 VITALS
SYSTOLIC BLOOD PRESSURE: 132 MMHG | RESPIRATION RATE: 18 BRPM | HEART RATE: 79 BPM | BODY MASS INDEX: 32.44 KG/M2 | WEIGHT: 219 LBS | TEMPERATURE: 98.3 F | HEIGHT: 69 IN | OXYGEN SATURATION: 98 % | DIASTOLIC BLOOD PRESSURE: 94 MMHG

## 2023-01-05 DIAGNOSIS — C91.10 CHRONIC LYMPHOCYTIC LEUKEMIA OF B-CELL TYPE NOT HAVING ACHIEVED REMISSION: ICD-10-CM

## 2023-01-05 DIAGNOSIS — Z04.9 ENCOUNTER FOR EXAMINATION AND OBSERVATION FOR UNSPECIFIED REASON: ICD-10-CM

## 2023-01-05 DIAGNOSIS — J45.909 UNSPECIFIED ASTHMA, UNCOMPLICATED: ICD-10-CM

## 2023-01-05 DIAGNOSIS — K21.9 GASTRO-ESOPHAGEAL REFLUX DISEASE W/OUT ESOPHAGITIS: ICD-10-CM

## 2023-01-05 DIAGNOSIS — Z03.89 ENCOUNTER FOR OBSERVATION FOR OTHER SUSPECTED DISEASES AND CONDITIONS RULED OUT: ICD-10-CM

## 2023-01-05 DIAGNOSIS — J32.9 CHRONIC SINUSITIS, UNSPECIFIED: ICD-10-CM

## 2023-01-05 PROCEDURE — 94010 BREATHING CAPACITY TEST: CPT

## 2023-01-05 PROCEDURE — 99396 PREV VISIT EST AGE 40-64: CPT | Mod: 25

## 2023-01-05 PROCEDURE — 99214 OFFICE O/P EST MOD 30 MIN: CPT | Mod: 25

## 2023-01-05 RX ORDER — AZELASTINE HYDROCHLORIDE AND FLUTICASONE PROPIONATE 137; 50 UG/1; UG/1
137-50 SPRAY, METERED NASAL
Qty: 1 | Refills: 5 | Status: ACTIVE | COMMUNITY
Start: 2023-01-05 | End: 1900-01-01

## 2023-01-06 LAB
ALBUMIN SERPL ELPH-MCNC: 4.4 G/DL
ALP BLD-CCNC: 111 U/L
ALT SERPL-CCNC: 14 U/L
ANION GAP SERPL CALC-SCNC: 14 MMOL/L
AST SERPL-CCNC: 16 U/L
BASOPHILS # BLD AUTO: 0 K/UL
BASOPHILS NFR BLD AUTO: 0 %
BILIRUB SERPL-MCNC: 0.5 MG/DL
BUN SERPL-MCNC: 17 MG/DL
CALCIUM SERPL-MCNC: 9.4 MG/DL
CHLORIDE SERPL-SCNC: 104 MMOL/L
CHOLEST SERPL-MCNC: 197 MG/DL
CO2 SERPL-SCNC: 24 MMOL/L
CREAT SERPL-MCNC: 0.78 MG/DL
EGFR: 101 ML/MIN/1.73M2
EOSINOPHIL # BLD AUTO: 0 K/UL
EOSINOPHIL NFR BLD AUTO: 0 %
GLUCOSE SERPL-MCNC: 85 MG/DL
HCT VFR BLD CALC: 43.2 %
HDLC SERPL-MCNC: 62 MG/DL
HGB BLD-MCNC: 14.2 G/DL
LDLC SERPL CALC-MCNC: 116 MG/DL
LYMPHOCYTES # BLD AUTO: 27.18 K/UL
LYMPHOCYTES NFR BLD AUTO: 78.8 %
MAN DIFF?: NORMAL
MCHC RBC-ENTMCNC: 30.5 PG
MCHC RBC-ENTMCNC: 32.9 GM/DL
MCV RBC AUTO: 92.7 FL
MONOCYTES # BLD AUTO: 2.14 K/UL
MONOCYTES NFR BLD AUTO: 6.2 %
NEUTROPHILS # BLD AUTO: 4.28 K/UL
NEUTROPHILS NFR BLD AUTO: 12.4 %
NONHDLC SERPL-MCNC: 135 MG/DL
PLATELET # BLD AUTO: 240 K/UL
POTASSIUM SERPL-SCNC: 4.1 MMOL/L
PROT SERPL-MCNC: 6.7 G/DL
RBC # BLD: 4.66 M/UL
RBC # FLD: 13.4 %
SODIUM SERPL-SCNC: 142 MMOL/L
TRIGL SERPL-MCNC: 95 MG/DL
WBC # FLD AUTO: 34.49 K/UL

## 2023-01-09 ENCOUNTER — TRANSCRIPTION ENCOUNTER (OUTPATIENT)
Age: 63
End: 2023-01-09

## 2023-01-09 ENCOUNTER — APPOINTMENT (OUTPATIENT)
Dept: ORTHOPEDIC SURGERY | Facility: CLINIC | Age: 63
End: 2023-01-09
Payer: COMMERCIAL

## 2023-01-09 VITALS — BODY MASS INDEX: 32.44 KG/M2 | HEIGHT: 69 IN | WEIGHT: 219 LBS

## 2023-01-09 DIAGNOSIS — M79.18 MYALGIA, OTHER SITE: ICD-10-CM

## 2023-01-09 DIAGNOSIS — Z12.9 ENCOUNTER FOR SCREENING FOR MALIGNANT NEOPLASM, SITE UNSPECIFIED: ICD-10-CM

## 2023-01-09 LAB
APPEARANCE: ABNORMAL
BACTERIA: NEGATIVE
BILIRUBIN URINE: NEGATIVE
BLOOD URINE: NEGATIVE
CALCIUM OXALATE CRYSTALS: ABNORMAL
COLOR: YELLOW
GLUCOSE QUALITATIVE U: NEGATIVE
HYALINE CASTS: 0 /LPF
KETONES URINE: NEGATIVE
LEUKOCYTE ESTERASE URINE: ABNORMAL
MICROSCOPIC-UA: NORMAL
NITRITE URINE: NEGATIVE
PH URINE: 6
PROTEIN URINE: ABNORMAL
RED BLOOD CELLS URINE: 3 /HPF
SPECIFIC GRAVITY URINE: 1.02
SQUAMOUS EPITHELIAL CELLS: 0 /HPF
UROBILINOGEN URINE: NORMAL
WHITE BLOOD CELLS URINE: 164 /HPF

## 2023-01-09 PROCEDURE — 99204 OFFICE O/P NEW MOD 45 MIN: CPT | Mod: 25

## 2023-01-09 PROCEDURE — J3490M: CUSTOM

## 2023-01-09 PROCEDURE — 20610 DRAIN/INJ JOINT/BURSA W/O US: CPT

## 2023-01-09 PROCEDURE — 73564 X-RAY EXAM KNEE 4 OR MORE: CPT | Mod: RT

## 2023-01-09 NOTE — ASSESSMENT
[FreeTextEntry1] : Right X-Ray Examination of the KNEE (4 views): medial and patellofemoral degenerate changes.\par \par - We discussed their diagnosis and treatment options at length including the risks and benefits of both surgical and non-surgical options.\par - We will continue conservative treatment with activity modification, PT, icing, weight loss, and anti-inflammatory medications.\par - The patient was provided with a PT prescription to work on ROM, hip ER/abductors strengthening, quad/hamstring stretches and strengthening, and other exercises \par - The patient was advised to let pain guide the gradual advancement of activities. \par - We also discussed the possible of a corticosteroid injection in order to help decrease inflammation and pain so that they can perform better therapy.\par - The risks, benefits, and alternatives to corticosteroid injection were reviewed with the patient and they wished to proceed with this treatment course. \par - Follow up as needed  to re-evaluate, if no improvement we spoke about possibility of viscosupplementation injections\par

## 2023-01-09 NOTE — HISTORY OF PRESENT ILLNESS
[de-identified] : 62 year old male  (RHD, ) chronic  rt knee pain worseing since 10/2022. Pain is posterior, worse with getting up from a seated position. Pt states previous bakers cyst in 2021 had  it aspirated in 2020\par The pain is located posterior and deep\par The pain is associated with tightness, stiffness\par Worse with activity and better at rest.\par

## 2023-01-09 NOTE — IMAGING
[de-identified] : \par RIGHT KNEE\par Inspection:  mild effusion, pop cyst\par Palpation: medial joint line tenderness, anterior tenderness\par Knee Range of Motion:  3-125 \par Strength: 5/5 Quadriceps strength, 5/5 Hamstring strength\par Neurological: light touch is intact throughout\par Ligament Stability and Special Tests: \par McMurrays: neg\par Lachman: neg\par Pivot Shift: neg\par Posterior Drawer: neg\par Valgus: neg\par Varus: neg\par Patella Apprehension: neg\par Patella Maltracking: neg\par \par

## 2023-01-10 ENCOUNTER — APPOINTMENT (OUTPATIENT)
Dept: UROLOGY | Facility: CLINIC | Age: 63
End: 2023-01-10
Payer: COMMERCIAL

## 2023-01-10 ENCOUNTER — APPOINTMENT (OUTPATIENT)
Dept: ORTHOPEDIC SURGERY | Facility: CLINIC | Age: 63
End: 2023-01-10
Payer: OTHER MISCELLANEOUS

## 2023-01-10 VITALS
HEART RATE: 63 BPM | DIASTOLIC BLOOD PRESSURE: 96 MMHG | WEIGHT: 219 LBS | HEIGHT: 69 IN | TEMPERATURE: 97.5 F | SYSTOLIC BLOOD PRESSURE: 149 MMHG | OXYGEN SATURATION: 99 % | BODY MASS INDEX: 32.44 KG/M2

## 2023-01-10 VITALS — BODY MASS INDEX: 32.44 KG/M2 | HEIGHT: 69 IN | WEIGHT: 219 LBS

## 2023-01-10 DIAGNOSIS — S43.421A SPRAIN OF RIGHT ROTATOR CUFF CAPSULE, INITIAL ENCOUNTER: ICD-10-CM

## 2023-01-10 DIAGNOSIS — M54.12 RADICULOPATHY, CERVICAL REGION: ICD-10-CM

## 2023-01-10 PROCEDURE — 99213 OFFICE O/P EST LOW 20 MIN: CPT

## 2023-01-10 PROCEDURE — 73030 X-RAY EXAM OF SHOULDER: CPT | Mod: RT

## 2023-01-10 PROCEDURE — 72040 X-RAY EXAM NECK SPINE 2-3 VW: CPT

## 2023-01-10 PROCEDURE — 99072 ADDL SUPL MATRL&STAF TM PHE: CPT

## 2023-01-10 PROCEDURE — 99214 OFFICE O/P EST MOD 30 MIN: CPT

## 2023-01-10 PROCEDURE — 73010 X-RAY EXAM OF SHOULDER BLADE: CPT | Mod: RT

## 2023-01-10 RX ORDER — DICLOFENAC SODIUM 75 MG/1
75 TABLET, DELAYED RELEASE ORAL
Qty: 60 | Refills: 0 | Status: ACTIVE | COMMUNITY
Start: 2023-01-10 | End: 1900-01-01

## 2023-01-10 NOTE — HISTORY OF PRESENT ILLNESS
[FreeTextEntry1] : 62 M presents for initial evaluation of rule out UTI\par \par PMH significant for: Asthma, GERD, leukemia (in remission) \par PSH significant for: Rotator Cuff Repair \par Significant meds: ASA 81 \par \par Presents to review abnormal screening UA \par 164 WBC noted on test\par No other abnormalities \par Patient is asymptomatic. Denies dysuria, hematuria, fevers, chills, abdominal pain\par No significant difficult voiding\par \par IPSS: 15, obstructive and irritative\par PVR: 70\par PSA ('19): 2.5\par \par Daytime Frequency: 8\par Nighttime Frequency: 2\par Pads per day: 0\par Straining to void: sometimes\par Intermittency: sometimes \par Dribbling: sometimes \par Subjective Incomplete Emptying: sometimes \par UTIs this past year: 0\par \par Daily Fluid Total: >100 oz\par Daily Caffeine Total: 32 oz\par \par Neurologic Hx, Vision or Balance changes: No\par

## 2023-01-10 NOTE — ASSESSMENT
[FreeTextEntry1] : R GH DJD.\par MRI to eval for tear.\par Ice.\par Diclofenac.\par Pt for stretching.\par He is working.\par RTO for review, consider GH injection.

## 2023-01-10 NOTE — WORK
[Sprain/Strain] : sprain/strain [Are consistent with the injury] : are consistent with the injury [Mild Partial] : mild partial [Does not reveal pre-existing condition(s) that may affect treatment/prognosis] : does not reveal pre-existing condition(s) that may affect treatment/prognosis [Can return to work without limitations on ______] : can return to work without limitations on [unfilled] [N/A] : : Not Applicable [Patient] : patient [No Rx restrictions] : No Rx restrictions. [I provided the services listed above] :  I provided the services listed above. [FreeTextEntry1] : good

## 2023-01-10 NOTE — HISTORY OF PRESENT ILLNESS
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 He states while at work on 11-17-22 he was holding a plank overhead that swung down on him. since then he has had pain in the neck and right shoulde. He notes limited range of motion above shoulder level and with er. He also notes stiffness in the neck and episodic tingling throughout the right upper extremity into the right hand.\par He has continued to work\par \par  [] : no [FreeTextEntry1] : right shoulder  [FreeTextEntry3] : 11/17/22 [FreeTextEntry5] : patient was injured at work at work while lifting a scaffolding. he is feeling some numbness in the hand and limited rom  [de-identified] : lifting overhead

## 2023-01-10 NOTE — PHYSICAL EXAM
[General Appearance - Well Developed] : well developed [General Appearance - Well Nourished] : well nourished [Abdomen Soft] : soft [Abdomen Tenderness] : non-tender [Penis Abnormality] : normal circumcised penis [Scrotum] : the scrotum was normal [Prostate Enlargement] : the prostate was not enlarged [Prostate Tenderness] : the prostate was not tender

## 2023-01-10 NOTE — PHYSICAL EXAM
[Flexion] : flexion [Extension] : extension [Rotation to left] : rotation to left [Rotation to right] : rotation to right [Right] : right shoulder [Sitting] : sitting [Moderate] : moderate [de-identified] : radicular complaints tingling into the right hand [] : negative drop-arm test [FreeTextEntry9] : pain and limitations with ir [de-identified] : 4+/5 checked in er against resistance [TWNoteComboBox7] : active forward flexion 140 degrees [TWNoteComboBox4] : passive forward flexion 145 degrees

## 2023-01-10 NOTE — IMAGING
[Straightening consistent with spasm] : Straightening consistent with spasm [FreeTextEntry1] : no fractures, gh narrowing and small osteophyte noted inferior humeral head

## 2023-01-10 NOTE — ASSESSMENT
[FreeTextEntry1] : Mr. Henderson presents for evaluation of isolated asymptomatic  elevated leukocytosis on a routine screening UA.\par UA today shows small LE\par He is a 9/11  with a history of leukemia in remission\par He has taken a 4 day course of augmentin x 4 days\par Last PSA ('19): 2.5\par Exam and PVR today WNL\par \par Recommendations\par -Ucx\par -PSA

## 2023-01-11 LAB
PSA FREE FLD-MCNC: 10 %
PSA FREE SERPL-MCNC: 0.09 NG/ML
PSA SERPL-MCNC: 0.9 NG/ML

## 2023-01-12 LAB — BACTERIA UR CULT: NORMAL

## 2023-02-01 ENCOUNTER — APPOINTMENT (OUTPATIENT)
Dept: ORTHOPEDIC SURGERY | Facility: CLINIC | Age: 63
End: 2023-02-01
Payer: OTHER MISCELLANEOUS

## 2023-02-01 VITALS — WEIGHT: 219 LBS | BODY MASS INDEX: 32.44 KG/M2 | HEIGHT: 69 IN

## 2023-02-01 PROCEDURE — 99072 ADDL SUPL MATRL&STAF TM PHE: CPT

## 2023-02-01 PROCEDURE — 20611 DRAIN/INJ JOINT/BURSA W/US: CPT | Mod: RT

## 2023-02-01 PROCEDURE — 99213 OFFICE O/P EST LOW 20 MIN: CPT | Mod: 25

## 2023-02-01 PROCEDURE — J3490M: CUSTOM | Mod: RT

## 2023-02-01 NOTE — DATA REVIEWED
[MRI] : MRI [Right] : of the right [Shoulder] : shoulder [I independently reviewed and interpreted images and report] : I independently reviewed and interpreted images and report [FreeTextEntry1] : ELICIA DJD, RC/biceps tendonitis.

## 2023-02-01 NOTE — PHYSICAL EXAM
[Flexion] : flexion [Extension] : extension [Rotation to left] : rotation to left [Rotation to right] : rotation to right [Right] : right shoulder [Sitting] : sitting [Moderate] : moderate [de-identified] : radicular complaints tingling into the right hand [] : negative drop-arm test [FreeTextEntry9] : pain and limitations with ir [de-identified] : 4+/5 checked in er against resistance [TWNoteComboBox7] : active forward flexion 140 degrees [TWNoteComboBox4] : passive forward flexion 145 degrees

## 2023-02-01 NOTE — HISTORY OF PRESENT ILLNESS
[4] : 4 [2] : 2 [Dull/Aching] : dull/aching [Full time] : Work status: full time [de-identified] : WC 11/17/22\par \par 2/1/23: Here to review MRI. He has PT scheduled next week. He brought MRI images but does not have report. 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 since then he has had pain in the neck and right shoulde. He notes limited range of motion above shoulder level and with er. He also notes stiffness in the neck and episodic tingling throughout the right upper extremity into the right hand.\par He has continued to work\par \par  [] : Post Surgical Visit: no [FreeTextEntry1] : right shoulder  [de-identified] : none

## 2023-02-01 NOTE — ASSESSMENT
[FreeTextEntry1] : R GH DJD.\par MRI images reviewed - GH DJD, RC/biceps tendonitis.\par Ice.\par Diclofenac prn.\par He is starting PT next week.\par R GH injection given.\par Consider R Trap TPI if not improved.\par He is working.\par RTO 6 weeks.\par \par Procedure Note:\par Large Joint Injection was performed because of pain and inflammation, failure of conservative treatment.  \par Medications:\par Depo-Medrol: 1 cc, 80 mg.\par Lidocaine: 2 cc, 1%. \par Marcaine: 2 cc, .25%. \par \par Medication was injected in the right glenohumeral joint. Patient has tried OTC's including aspirin, Ibuprofen, Aleve etc or prescription NSAIDs, and/or exercises at home and/ or physical therapy without satisfactory response. The risks, benefits, and alternatives to cortisone injection were explained in full to the patient. Risks outlined include but are not limited to infection, sepsis, bleeding, scarring, skin discoloration, temporary increase in pain, syncopal episode, failure to resolve symptoms, allergic reaction, symptom recurrence, and elevation of blood sugar in diabetics. Patient understood the risks. All questions were answered. After discussion of options, patient requested an injection. Oral informed consent was obtained and sterile prep of the injection site was performed using alcohol. Sterile technique was utilized for the procedure including the preparation of the solutions used for the injection. Ethyl chloride spray was used topically.  Sterile technique used. Patient tolerated procedure well. Post Procedure Instructions: Patient was advised to call if redness, pain, or fever occur and apply ice for 15 min. out of every hour for the next 12-24 hours as tolerated. patient was advised to rest the joint(s) for 2 days.\par \par Ultrasound Guidance was used for the following reasons: for Glenohumeral injection. \par Ultrasound guided injection was performed of the shoulder, visualization of the needle and placement of injection was performed without complication.\par \par

## 2023-02-02 ENCOUNTER — TRANSCRIPTION ENCOUNTER (OUTPATIENT)
Age: 63
End: 2023-02-02

## 2023-02-03 ENCOUNTER — TRANSCRIPTION ENCOUNTER (OUTPATIENT)
Age: 63
End: 2023-02-03

## 2023-02-08 ENCOUNTER — RX RENEWAL (OUTPATIENT)
Age: 63
End: 2023-02-08

## 2023-03-02 ENCOUNTER — RX RENEWAL (OUTPATIENT)
Age: 63
End: 2023-03-02

## 2023-03-02 RX ORDER — ESOMEPRAZOLE MAGNESIUM 40 MG/1
40 CAPSULE, DELAYED RELEASE ORAL
Qty: 30 | Refills: 1 | Status: ACTIVE | COMMUNITY
Start: 2023-01-05 | End: 1900-01-01

## 2023-03-07 ENCOUNTER — APPOINTMENT (OUTPATIENT)
Dept: ORTHOPEDIC SURGERY | Facility: CLINIC | Age: 63
End: 2023-03-07
Payer: OTHER MISCELLANEOUS

## 2023-03-07 VITALS — BODY MASS INDEX: 32.44 KG/M2 | WEIGHT: 219 LBS | HEIGHT: 69 IN

## 2023-03-07 DIAGNOSIS — M75.81 OTHER SHOULDER LESIONS, RIGHT SHOULDER: ICD-10-CM

## 2023-03-07 DIAGNOSIS — M75.21 BICIPITAL TENDINITIS, RIGHT SHOULDER: ICD-10-CM

## 2023-03-07 DIAGNOSIS — M19.011 PRIMARY OSTEOARTHRITIS, RIGHT SHOULDER: ICD-10-CM

## 2023-03-07 PROCEDURE — 99213 OFFICE O/P EST LOW 20 MIN: CPT | Mod: 25

## 2023-03-07 PROCEDURE — 99072 ADDL SUPL MATRL&STAF TM PHE: CPT

## 2023-03-07 PROCEDURE — 20552 NJX 1/MLT TRIGGER POINT 1/2: CPT | Mod: RT

## 2023-03-07 NOTE — PHYSICAL EXAM
[Flexion] : flexion [Extension] : extension [Rotation to left] : rotation to left [Rotation to right] : rotation to right [Right] : right shoulder [Sitting] : sitting [Moderate] : moderate [de-identified] : radicular complaints tingling into the right hand [] : negative drop-arm test [FreeTextEntry9] : pain and limitations with ir [de-identified] : 4+/5 checked in er against resistance [TWNoteComboBox7] : active forward flexion 140 degrees [TWNoteComboBox4] : passive forward flexion 145 degrees

## 2023-03-07 NOTE — PROCEDURE
[Trigger point 1-2 muscle groups] : trigger point 1-2 muscle groups [Right] : of the right [Trapezius muscle] : trapezius muscle [Pain] : pain [Inflammation] : inflammation [Alcohol] : alcohol [___ cc    2%] : Lidocaine ~Vcc of 2%  [___ cc    4mg] : Dexamethasone (Decadron) ~Vcc of 4 mg  [] : Patient tolerated procedure well [Apply ice for 15min out of every hour for the next 12-24 hours as tolerated] : apply ice for 15 minutes out of every hour for the next 12-24 hours as tolerated [Previous OTC use and PT nontherapeutic] : patient has tried OTC's including aspirin, Ibuprofen, Aleve, etc or prescription NSAIDS, and/or exercises at home and/or physical therapy without satisfactory response [Risks, benefits, alternatives discussed / Verbal consent obtained] : the risks benefits, and alternatives have been discussed, and verbal consent was obtained

## 2023-03-07 NOTE — ASSESSMENT
[FreeTextEntry1] : R GH DJD.\par MRI images reviewed - GH DJD, RC/biceps tendonitis.\par Ice.\par Diclofenac prn.\par He is in PT.\par R GH injection 2/1/23.\par R Trap TPI given today.\par He is working.\par Recommend consult with pain mgt.\par \par

## 2023-03-07 NOTE — HISTORY OF PRESENT ILLNESS
[Work related] : work related [4] : 4 [2] : 2 [Dull/Aching] : dull/aching [Full time] : Work status: full time [de-identified] : WC 11/17/22\par \par 3/7/23: Here for follow up. He states GH injection did not help much. He continues to have pain in his trapezius. He is in PT with some relief.\par \par 2/1/23: Here to review MRI. He has PT scheduled next week. He brought MRI images but does not have report. 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 since then he has had pain in the neck and right shoulde. He notes limited range of motion above shoulder level and with er. He also notes stiffness in the neck and episodic tingling throughout the right upper extremity into the right hand.\par He has continued to work\par \par  [] : Post Surgical Visit: no [FreeTextEntry1] : rt shoulder  [FreeTextEntry3] : 11/17/22 [de-identified] : cortisone injection last visit

## 2023-03-09 ENCOUNTER — TRANSCRIPTION ENCOUNTER (OUTPATIENT)
Age: 63
End: 2023-03-09

## 2023-04-26 ENCOUNTER — FORM ENCOUNTER (OUTPATIENT)
Age: 63
End: 2023-04-26

## 2023-04-27 ENCOUNTER — OUTPATIENT (OUTPATIENT)
Dept: OUTPATIENT SERVICES | Facility: HOSPITAL | Age: 63
LOS: 1 days | Discharge: HOME | End: 2023-04-27

## 2023-04-27 DIAGNOSIS — D72.820 LYMPHOCYTOSIS (SYMPTOMATIC): ICD-10-CM

## 2023-05-04 ENCOUNTER — APPOINTMENT (OUTPATIENT)
Dept: ORTHOPEDIC SURGERY | Facility: CLINIC | Age: 63
End: 2023-05-04
Payer: COMMERCIAL

## 2023-05-04 VITALS — WEIGHT: 222 LBS | HEIGHT: 69 IN | BODY MASS INDEX: 32.88 KG/M2

## 2023-05-04 DIAGNOSIS — M17.11 UNILATERAL PRIMARY OSTEOARTHRITIS, RIGHT KNEE: ICD-10-CM

## 2023-05-04 PROCEDURE — 20610 DRAIN/INJ JOINT/BURSA W/O US: CPT | Mod: RT

## 2023-05-04 PROCEDURE — J3490M: CUSTOM

## 2023-05-04 PROCEDURE — 99214 OFFICE O/P EST MOD 30 MIN: CPT | Mod: 25

## 2023-05-04 NOTE — IMAGING
[de-identified] : \par RIGHT KNEE\par Inspection:  mild effusion, pop cyst\par Palpation: medial joint line tenderness, anterior tenderness\par Knee Range of Motion:  3-125 \par Strength: 5/5 Quadriceps strength, 5/5 Hamstring strength\par Neurological: light touch is intact throughout\par Ligament Stability and Special Tests: \par McMurrays: neg\par Lachman: neg\par Pivot Shift: neg\par Posterior Drawer: neg\par Valgus: neg\par Varus: neg\par Patella Apprehension: neg\par Patella Maltracking: neg\par \par

## 2023-05-04 NOTE — ASSESSMENT
[FreeTextEntry1] : - We discussed their diagnosis and treatment options at length including the risks and benefits of both surgical and non-surgical options.\par - We will continue conservative treatment with activity modification, PT, icing, weight loss, and anti-inflammatory medications.\par - The patient was provided with a PT prescription to work on ROM, hip ER/abductors strengthening, quad/hamstring stretches and strengthening, and other exercises \par - The patient was advised to let pain guide the gradual advancement of activities. \par - We also discussed the possible of a corticosteroid injection in order to help decrease inflammation and pain so that they can perform better therapy.\par - The risks, benefits, and alternatives to corticosteroid injection were reviewed with the patient and they wished to proceed with this treatment course. \par - Follow up as needed  to re-evaluate, if no improvement we spoke about possibility of viscosupplementation injections - will submit for auth \par

## 2023-05-04 NOTE — HISTORY OF PRESENT ILLNESS
[de-identified] : 62 year old male  (RHD, ) chronic  rt knee pain worsening since 10/2022. Pain is posterior, worse with getting up from a seated position. Pt states previous bakers cyst in 2021 had  it aspirated in 2020\par The pain is located posterior and deep\par The pain is associated with tightness, stiffness\par Worse with activity and better at rest.\par \par 5/4/23 - had CSI (1/9) with some relief and went to PT at [facility]

## 2023-05-08 ENCOUNTER — RESULT REVIEW (OUTPATIENT)
Age: 63
End: 2023-05-08

## 2023-05-08 ENCOUNTER — APPOINTMENT (OUTPATIENT)
Dept: HEMATOLOGY ONCOLOGY | Facility: CLINIC | Age: 63
End: 2023-05-08
Payer: COMMERCIAL

## 2023-05-08 ENCOUNTER — APPOINTMENT (OUTPATIENT)
Dept: ORTHOPEDIC SURGERY | Facility: CLINIC | Age: 63
End: 2023-05-08

## 2023-05-08 VITALS
HEIGHT: 69 IN | HEART RATE: 64 BPM | DIASTOLIC BLOOD PRESSURE: 99 MMHG | BODY MASS INDEX: 33.03 KG/M2 | SYSTOLIC BLOOD PRESSURE: 144 MMHG | WEIGHT: 223 LBS | TEMPERATURE: 98 F | RESPIRATION RATE: 16 BRPM | OXYGEN SATURATION: 98 %

## 2023-05-08 LAB
ALBUMIN SERPL ELPH-MCNC: 4.4 G/DL
ALP BLD-CCNC: 102 U/L
ALT SERPL-CCNC: 14 U/L
ANION GAP SERPL CALC-SCNC: 14 MMOL/L
AST SERPL-CCNC: 20 U/L
BASOPHILS # BLD AUTO: 0.18 K/UL — SIGNIFICANT CHANGE UP (ref 0–0.2)
BASOPHILS NFR BLD AUTO: 0.5 % — SIGNIFICANT CHANGE UP (ref 0–2)
BILIRUB SERPL-MCNC: 0.5 MG/DL
BUN SERPL-MCNC: 17 MG/DL
CALCIUM SERPL-MCNC: 10.4 MG/DL
CHLORIDE SERPL-SCNC: 105 MMOL/L
CO2 SERPL-SCNC: 25 MMOL/L
CREAT SERPL-MCNC: 0.83 MG/DL
EGFR: 99 ML/MIN/1.73M2
EOSINOPHIL # BLD AUTO: 0.35 K/UL — SIGNIFICANT CHANGE UP (ref 0–0.5)
EOSINOPHIL NFR BLD AUTO: 1 % — SIGNIFICANT CHANGE UP (ref 0–6)
GLUCOSE SERPL-MCNC: 93 MG/DL
HCT VFR BLD CALC: 41.1 % — SIGNIFICANT CHANGE UP (ref 39–50)
HGB BLD-MCNC: 13.4 G/DL — SIGNIFICANT CHANGE UP (ref 13–17)
LDH SERPL-CCNC: 209 U/L
LYMPHOCYTES # BLD AUTO: 29.6 K/UL — HIGH (ref 1–3.3)
LYMPHOCYTES # BLD AUTO: 83.5 % — HIGH (ref 13–44)
LYMPHOCYTES # SPEC AUTO: 1.5 % — HIGH (ref 0–0)
MAGNESIUM SERPL-MCNC: 2.2 MG/DL
MCHC RBC-ENTMCNC: 30.5 PG — SIGNIFICANT CHANGE UP (ref 27–34)
MCHC RBC-ENTMCNC: 32.6 G/DL — SIGNIFICANT CHANGE UP (ref 32–36)
MCV RBC AUTO: 93.6 FL — SIGNIFICANT CHANGE UP (ref 80–100)
MONOCYTES # BLD AUTO: 0.35 K/UL — SIGNIFICANT CHANGE UP (ref 0–0.9)
MONOCYTES NFR BLD AUTO: 1 % — LOW (ref 2–14)
NEUTROPHILS # BLD AUTO: 4.43 K/UL — SIGNIFICANT CHANGE UP (ref 1.8–7.4)
NEUTROPHILS NFR BLD AUTO: 12.5 % — LOW (ref 43–77)
NRBC # BLD: 0 /100 — SIGNIFICANT CHANGE UP (ref 0–0)
NRBC # BLD: SIGNIFICANT CHANGE UP /100 WBCS (ref 0–0)
PHOSPHATE SERPL-MCNC: 3 MG/DL
PLAT MORPH BLD: NORMAL — SIGNIFICANT CHANGE UP
PLATELET # BLD AUTO: 228 K/UL — SIGNIFICANT CHANGE UP (ref 150–400)
POTASSIUM SERPL-SCNC: 4.7 MMOL/L
PROT SERPL-MCNC: 6.7 G/DL
RBC # BLD: 4.39 M/UL — SIGNIFICANT CHANGE UP (ref 4.2–5.8)
RBC # FLD: 13 % — SIGNIFICANT CHANGE UP (ref 10.3–14.5)
RBC BLD AUTO: SIGNIFICANT CHANGE UP
SMUDGE CELLS # BLD: PRESENT — SIGNIFICANT CHANGE UP
SODIUM SERPL-SCNC: 144 MMOL/L
URATE SERPL-MCNC: 5.4 MG/DL
WBC # BLD: 35.45 K/UL — HIGH (ref 3.8–10.5)
WBC # FLD AUTO: 35.45 K/UL — HIGH (ref 3.8–10.5)

## 2023-05-08 PROCEDURE — 99213 OFFICE O/P EST LOW 20 MIN: CPT

## 2023-05-09 ENCOUNTER — APPOINTMENT (OUTPATIENT)
Dept: HEMATOLOGY ONCOLOGY | Facility: CLINIC | Age: 63
End: 2023-05-09

## 2023-05-17 NOTE — ASSESSMENT
[FreeTextEntry1] : Mr. Henderson is a 62 year old male with CLL (diagnosed 2008, 13q deletion, mutated IGHV), treatment naive, who presents for follow-up, no new complaints. No evidence of disease progression, no B sx, no LAD or organomegaly. We can continue with yearly follow-ups.\par \par Plan:\par 1)CLL: no B symptoms or new lymphadenopathy, no indication at this moment for CLL-directed therapy, WBC stable, no anemia or thrombocytopenia\par 2)RCHM: continue routine follow-up at [facility] clinic as scheduled. continue routine health maintenance and age appropriate screening as indicated, discussed need for annual dermatology evaluation, COVID vaccine and colonoscopies as recommended\par 3) RTO in 6-12 months, sooner prn\par

## 2023-05-17 NOTE — PHYSICAL EXAM
[Fully active, able to carry on all pre-disease performance without restriction] : Status 0 - Fully active, able to carry on all pre-disease performance without restriction [Normal] : affect appropriate [de-identified] : EOMI, anicteric sclera, conjunctiva normal

## 2023-05-17 NOTE — HISTORY OF PRESENT ILLNESS
[de-identified] :  is a 62 year old male with CLL (Diagnosed 2008, 13q del, mutated IGHV), treatment naive, who presents for his yearly follow-up. He continues to get monitoring through the [facility] clinic under the care of Dr. Shelby.\par \par He presents today for follow up. He overall feels well overall. No fevers or drenching night sweats. Good appetite. Weight overall stable. Normal bowel movements. No chest pain or shortness of breath. Energy stable. No new infections.  Pt states he noticed a new lump on his back 2 weeks again when getting a massage; no pain [de-identified] : 13qdel\par Mutated IgVH

## 2023-05-17 NOTE — RESULTS/DATA
[FreeTextEntry1] : cbc with diff done 5/8/23\par wbc- 35.45\par hgb- 13.4\par plt- 228\par alc- 30.60\par anc- 4.02

## 2023-05-26 RX ORDER — HYALURONATE SODIUM 20 MG/2 ML
20 SYRINGE (ML) INTRAARTICULAR
Qty: 3 | Refills: 0 | Status: ACTIVE | COMMUNITY
Start: 2023-05-26

## 2023-06-30 ENCOUNTER — APPOINTMENT (OUTPATIENT)
Dept: ORTHOPEDIC SURGERY | Facility: CLINIC | Age: 63
End: 2023-06-30
Payer: COMMERCIAL

## 2023-06-30 VITALS — WEIGHT: 225 LBS | BODY MASS INDEX: 33.33 KG/M2 | HEIGHT: 69 IN

## 2023-06-30 DIAGNOSIS — M71.21 SYNOVIAL CYST OF POPLITEAL SPACE [BAKER], RIGHT KNEE: ICD-10-CM

## 2023-06-30 PROCEDURE — 99204 OFFICE O/P NEW MOD 45 MIN: CPT

## 2023-06-30 PROCEDURE — 73564 X-RAY EXAM KNEE 4 OR MORE: CPT | Mod: RT

## 2023-06-30 NOTE — ADDENDUM
[FreeTextEntry1] : This note was written by MIKE MOORE on 06/30/2023 acting as scribe for Dr. Julio Gray M.D.\par \par I, Dr. Julio Gray, have read and attest that all the information, medical decision making and discharge instructions within are true and accurate. \par \par This note was written by Pedro Barrera on 06/30/2023 acting as scribe for Dr. Julio Gray M.D.\par \par I, Dr. Julio Gray, have read and attest that all the information, medical decision making and discharge instructions within are true and accurate.

## 2023-06-30 NOTE — DISCUSSION/SUMMARY
[de-identified] : Their right knee symptoms appear secondary to degenerative arthritis with a popliteal cyst. We reviewed operative and nonoperative treatment. While I believe they would eventually benefit from a TKR, they are hesitant to have surgery at this time. Therefore, we will continue nonoperatively. We will seek authorization for RIGHT knee viscosupplementation injections. Once we receive authorization, we will proceed accordingly. Declined cortisone injection today. I also suggested PT, a home exercise program, weight loss, and Motrin prn. \par \par Patient can continue activities as tolerated. All questions answered, understanding verbalized. Patient in agreement with plan of care.

## 2023-06-30 NOTE — HISTORY OF PRESENT ILLNESS
[de-identified] : FLOR BLAKE 62 year old male presents for initial evaluation of RIGHT knee pain that has been occurring for about 3 to 4 years. 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 He has pain on the peripatellar aspect of his knee but is worse on the posterior aspect of his knee radiating down to his posterior calf and anterior quad. He denies any numbness or tingling. His pain is both sharp and dull. He denies any mechanical symptoms. He is able to walk the entirety of a golf course but finds stairs bothersome and tries to avoid them. He takes Diclofenac and Motrin. He had a Baker's cyst aspirated 2 times. In 2020 the aspiration made his symptoms fell better but the aspiration in May 2023 only provided relief for about a month. He last received cortisone injections in January and May 2023 at [facility] with minimal relief. He has done 5 months of PT; it provided him with strength but no improvement with his pain. He also has occasional mild lower back pain. \par \par PM Hx: Jadiel - Steida, GERD, BPH

## 2023-06-30 NOTE — PHYSICAL EXAM
[de-identified] : General appearance: well nourished and hydrated, pleasant, alert and oriented x 3, cooperative.\par HEENT: Normocephalic, EOM intact, Nasal septum midline, Oral cavity clear, External auditory canal clear.\par Cardiovascular: no apparent abnormalities, no lower leg edema, no varicosities, pedal pulses are palpable.\par Lymphatics Lymph nodes: none palpated, Lymphedema: not present.\par Neurologic: sensation is normal, no muscle weakness in upper or lower extremities, patella tendon reflexes intact .\par Dermatologic no apparent skin lesions, moist, warm, no rash.\par Spine:cervical spine appears normal and moves freely, thoracic spine appears normal and moves freely, lumbosacral spine appears normal and moves freely.\par Gait: nonantalgic.\par \par Left knee\par Inspection: no effusion or erythema.\par Wounds: none.\par Alignment: normal.\par Palpation: no specific tenderness on palpation.\par ROM active (in degrees): 0-135 with patellofemoral crepitus \par Ligamentous laxity: all ligaments appear stable,, negative ant. drawer test, negative post. drawer test, stable to varus stress test, stable to valgus stress test. negative Lachman's test, negative pivot shift test\par Meniscal Test: negative McMurrays, negative Nacho.\par Patellofemoral Alignment Test: Q angle-, normal.\par Muscle Test: good quad strength.\par Leg examination: calf is soft and non-tender.\par \par Right knee\par Inspection: trace effusion, no erythema. popliteal cyst.\par Wounds: none.\par Alignment: normal.\par Palpation: retropatellar tenderness medially and laterally on palpation.\par ROM active (in degrees) 0-130 with pain on extremes of flexion \par Ligamentous laxity: all ligaments appear stable,, negative ant. drawer test, negative post. drawer test, stable to varus stress test, stable to valgus stress test. negative Lachman's test, negative pivot shift test\par Meniscal Test: negative McMurrays, negative Nacho.\par Patellofemoral Alignment Test: tight hamstring, popliteal angle 30 degrees, tight iliotibial band \par Muscle Test: good quad strength.\par Leg examination: calf is soft and non-tender.\par \par Left hip\par Inspection: No swelling or ecchymosis.\par Wounds: none.\par Palpation: non-tender.\par Stability: no instability.\par Strength: 5/5 all motor groups.\par ROM: no pain with FROM.\par Leg length: equal.\par \par Right hip\par Inspection: No swelling or ecchymosis.\par Wounds: none.\par Palpation: non-tender.\par Stability: no instability.\par Strength: 5/5 all motor groups.\par ROM: no pain with FROM.\par Leg length: equal.\par \par Left ankle\par Inspection: no erythema noted, no swelling noted.\par Palpation: no pain on palpation .\par ROM: FROM without crepitus.\par Muscle strength: 5/5.\par Stability: no instability noted.\par \par Right ankle\par Inspection: no erythema noted, no swelling noted.\par ROM: FROM without crepitus.\par Palpation: no pain on palpation .\par Muscle strength: 5/5.\par Stability: no instability noted.\par \par Left foot\par Inspection: color, texture and turgor are normal.\par ROM: full range of motion of all joints without pain or crepitus.\par Palpation: no tenderness.\par Stability: no instability noted.\par \par Right foot\par Inspection: color, texture and turgor are normal.\par ROM: full range of motion of all joints without pain or crepitus.\par Palpation: no tenderness.\par Stability: no instability noted.\par \par Left shoulder\par Inspection: no muscle asymmetry, no atrophy.\par Palpation: no tenderness noted, ACJ non-tender.\par ROM: full active ROM, full passive ROM.\par Strength testing): anterior deltoid, supraspinatus, infraspinatus, subscapularis all 5/5.\par Stability test: ant. apprehension negative, post. apprehension negative, relocation test negative.\par Impingement Test: negative NEER.\par \par Right shoulder\par Inspection: no muscle asymmetry, no atrophy.\par Palpation: no tenderness noted, ACJ non-tender.\par ROM: full active ROM, full passive ROM.\par Strength testing): anterior deltoid, supraspinatus, infraspinatus, subscapularis all 5/5.\par Stability test: ant. apprehension negative, post. apprehension negative, relocation test negative.\par Impingement Test: negative NEER.\par Surgical Wounds: none.\par \par Left elbow\par Inspection: negative swelling.\par Wounds: none.\par Palpation: non-tender.\par ROM: full ROM.\par Strength: 5/5 all groups.\par Stability: no instability.\par Mass: none.\par \par Right elbow\par Inspection: negative swelling.\par Wounds: none.\par Palpation: non-tender.\par ROM: 20 degree flexion contracture\par Strength: 5/5 all groups.\par Stability: no instability.\par Mass: none.\par \par Left wrist\par Inspection: negative swelling.\par Wound: none.\par Palpation (bone): no tenderness.\par ROM: full ROM.\par Strength: full , good.\par \par Right wrist\par Inspection: negative swelling.\par Wound: none.\par Palpation (bone): no tenderness.\par ROM: full ROM.\par Strength: full , good.\par \par Left hand \par Inspection: no skin changes, normal appearance.\par Wounds: none.Strength: full , able to make full fist.\par Sensation: light touch intact all fingers and thumb.\par Vascular: good capillary refill < 3 seconds, all fingers and thumb.\par Mass: none.\par \par Right hand \par Inspection: no skin changes, normal appearance.\par Wounds: none.Strength: full , able to make full fist.\par Sensation: light touch intact all fingers and thumb.\par Vascular: good capillary refill < 3 seconds, all fingers and thumb.\par Mass: none. [de-identified] : Right knee x-rays, standing AP/Lateral and Merchant films, and 45 degree PA standing view, taken at the office today shows degenerative arthritis, normal alignment, decreased medial joint height especially on the Whittaker view, bone ossicle adjacent to medial femoral condyle, marginal osteophytes, bone on bone sclerosis, patellofemoral joint space narrowing, peripheral osteophytes, Kellgren and Beni grade 3 with severe patellofemoral arthritis. \par \par Left knee x-ray merchant view taken at the office today demonstrates significant patellofemoral arthritis with bone on bone sclerosis, joint space narrowing, osteophytes and a well centered patella.

## 2023-08-28 ENCOUNTER — APPOINTMENT (OUTPATIENT)
Dept: ORTHOPEDIC SURGERY | Facility: CLINIC | Age: 63
End: 2023-08-28
Payer: COMMERCIAL

## 2023-08-28 PROCEDURE — 20610 DRAIN/INJ JOINT/BURSA W/O US: CPT | Mod: RT

## 2023-08-28 NOTE — PROCEDURE
[de-identified] : Euflexxa # 1 Right knee Discussed at length with the patient the planned Euflexxa injection. The risks, benefits, convalescence and alternatives were reviewed. The possible side effects discussed included but were not limited to: pain, swelling, heat and redness. There symptoms are generally mild but if they are extensive then contact the office. Giving pain relievers by mouth such as NSAIDs or Tylenol can generally treat the reactions to Euflexxa. Rare cases of infection have been noted. Rash, hives and itching may occur post injection. If you have muscle pain or cramps, flushing and or swelling of the face, rapid heart beat, nausea, dizziness, fever, chills, headache, difficulty breathing, swelling in the arms or legs, or have a prickly feeling of your skin, contact a health care provider immediately.  Following this discussion, the knee was prepped with betadine and under sterile condition the Euflexxa injection was performed with a 22 gauge needle. The needle was introduced into the joint, aspiration was performed to ensure intra-articular placement and the medication was injected. Upon withdrawal of the needle the site was cleaned with alcohol and a bandaid applied. The patient tolerated the injection well and there were no adverse effects. Post injection instructions included no strenuous activity for 24 hours, cryotherapy and if there are any adverse effects to contact the office.

## 2023-08-30 ENCOUNTER — TRANSCRIPTION ENCOUNTER (OUTPATIENT)
Age: 63
End: 2023-08-30

## 2023-09-01 NOTE — PROCEDURE
[de-identified] : Euflexxa # 2 Right knee Discussed at length with the patient the planned Euflexxa injection. The risks, benefits, convalescence and alternatives were reviewed. The possible side effects discussed included but were not limited to: pain, swelling, heat and redness. There symptoms are generally mild but if they are extensive then contact the office. Giving pain relievers by mouth such as NSAIDs or Tylenol can generally treat the reactions to Euflexxa. Rare cases of infection have been noted. Rash, hives and itching may occur post injection. If you have muscle pain or cramps, flushing and or swelling of the face, rapid heart beat, nausea, dizziness, fever, chills, headache, difficulty breathing, swelling in the arms or legs, or have a prickly feeling of your skin, contact a health care provider immediately.  Following this discussion, the knee was prepped with betadine and under sterile condition the Euflexxa injection was performed with a 22 gauge needle. The needle was introduced into the joint, aspiration was performed to ensure intra-articular placement and the medication was injected. Upon withdrawal of the needle the site was cleaned with alcohol and a bandaid applied. The patient tolerated the injection well and there were no adverse effects. Post injection instructions included no strenuous activity for 24 hours, cryotherapy and if there are any adverse effects to contact the office.

## 2023-09-06 ENCOUNTER — APPOINTMENT (OUTPATIENT)
Dept: ORTHOPEDIC SURGERY | Facility: CLINIC | Age: 63
End: 2023-09-06
Payer: COMMERCIAL

## 2023-09-06 PROCEDURE — 20610 DRAIN/INJ JOINT/BURSA W/O US: CPT | Mod: RT

## 2023-09-13 ENCOUNTER — APPOINTMENT (OUTPATIENT)
Dept: ORTHOPEDIC SURGERY | Facility: CLINIC | Age: 63
End: 2023-09-13
Payer: COMMERCIAL

## 2023-09-13 PROCEDURE — 20610 DRAIN/INJ JOINT/BURSA W/O US: CPT | Mod: RT

## 2023-09-18 ENCOUNTER — APPOINTMENT (OUTPATIENT)
Dept: ORTHOPEDIC SURGERY | Facility: CLINIC | Age: 63
End: 2023-09-18

## 2023-10-10 ENCOUNTER — APPOINTMENT (OUTPATIENT)
Dept: ORTHOPEDIC SURGERY | Facility: CLINIC | Age: 63
End: 2023-10-10
Payer: OTHER MISCELLANEOUS

## 2023-10-10 VITALS — WEIGHT: 225 LBS | HEIGHT: 69 IN | BODY MASS INDEX: 33.33 KG/M2

## 2023-10-10 DIAGNOSIS — M12.521: ICD-10-CM

## 2023-10-10 PROCEDURE — 73080 X-RAY EXAM OF ELBOW: CPT | Mod: RT

## 2023-10-10 PROCEDURE — 99214 OFFICE O/P EST MOD 30 MIN: CPT

## 2023-10-23 ENCOUNTER — APPOINTMENT (OUTPATIENT)
Dept: MRI IMAGING | Facility: CLINIC | Age: 63
End: 2023-10-23
Payer: OTHER MISCELLANEOUS

## 2023-10-23 PROCEDURE — 73221 MRI JOINT UPR EXTREM W/O DYE: CPT | Mod: RT

## 2023-10-25 ENCOUNTER — APPOINTMENT (OUTPATIENT)
Dept: CT IMAGING | Facility: CLINIC | Age: 63
End: 2023-10-25
Payer: OTHER MISCELLANEOUS

## 2023-10-25 PROCEDURE — 76376 3D RENDER W/INTRP POSTPROCES: CPT | Mod: RT

## 2023-10-25 PROCEDURE — 73200 CT UPPER EXTREMITY W/O DYE: CPT | Mod: RT

## 2023-10-27 ENCOUNTER — APPOINTMENT (OUTPATIENT)
Dept: ORTHOPEDIC SURGERY | Facility: CLINIC | Age: 63
End: 2023-10-27
Payer: OTHER MISCELLANEOUS

## 2023-10-27 VITALS — WEIGHT: 225 LBS | BODY MASS INDEX: 33.33 KG/M2 | HEIGHT: 69 IN

## 2023-10-27 DIAGNOSIS — M19.021 PRIMARY OSTEOARTHRITIS, RIGHT ELBOW: ICD-10-CM

## 2023-10-27 PROCEDURE — 99214 OFFICE O/P EST MOD 30 MIN: CPT

## 2023-11-21 ENCOUNTER — APPOINTMENT (OUTPATIENT)
Dept: ORTHOPEDIC SURGERY | Facility: CLINIC | Age: 63
End: 2023-11-21

## 2024-01-08 ENCOUNTER — APPOINTMENT (OUTPATIENT)
Dept: OTHER | Facility: CLINIC | Age: 64
End: 2024-01-08

## 2024-01-14 ENCOUNTER — NON-APPOINTMENT (OUTPATIENT)
Age: 64
End: 2024-01-14

## 2024-06-07 ENCOUNTER — APPOINTMENT (OUTPATIENT)
Dept: ORTHOPEDIC SURGERY | Facility: CLINIC | Age: 64
End: 2024-06-07
Payer: COMMERCIAL

## 2024-06-07 VITALS — BODY MASS INDEX: 33.77 KG/M2 | WEIGHT: 228 LBS | HEIGHT: 69 IN

## 2024-06-07 DIAGNOSIS — M17.11 UNILATERAL PRIMARY OSTEOARTHRITIS, RIGHT KNEE: ICD-10-CM

## 2024-06-07 DIAGNOSIS — M71.21 SYNOVIAL CYST OF POPLITEAL SPACE [BAKER], RIGHT KNEE: ICD-10-CM

## 2024-06-07 PROCEDURE — 99213 OFFICE O/P EST LOW 20 MIN: CPT | Mod: 25

## 2024-06-07 PROCEDURE — 73564 X-RAY EXAM KNEE 4 OR MORE: CPT | Mod: RT

## 2024-06-07 PROCEDURE — 20610 DRAIN/INJ JOINT/BURSA W/O US: CPT | Mod: RT

## 2024-06-07 NOTE — CONSULT LETTER
[Dear  ___] : Dear  [unfilled], [Consult Letter:] : I had the pleasure of evaluating your patient, [unfilled]. [Please see my note below.] : Please see my note below. [Consult Closing:] : Thank you very much for allowing me to participate in the care of this patient.  If you have any questions, please do not hesitate to contact me. [Sincerely,] : Sincerely, [FreeTextEntry2] : ANDERS DANG

## 2024-06-07 NOTE — PHYSICAL EXAM
[de-identified] : General appearance: well-nourished and hydrated, pleasant, alert and oriented x 3, cooperative. HEENT: Normocephalic, EOM intact, Nasal septum midline, Oral cavity clear, External auditory canal clear. Cardiovascular: no apparent abnormalities, no lower leg edema, no varicosities, pedal pulses are palpable. Lymphatics Lymph nodes: none palpated, Lymphedema: not present. Neurologic: sensation is normal, no muscle weakness in upper or lower extremities, patella tendon reflexes intact. Dermatologic no apparent skin lesions, moist, warm, no rash. Spine: cervical spine appears normal and moves freely, thoracic spine appears normal and moves freely, lumbosacral spine appears normal and moves freely. Gait: nonantalgic.   Right Knee Inspection: trace effusion. Wounds: none. Alignment: normal. Palpation: tenderness of the superior pull of the patella with a popliteal cyst. ROM: Active (in degrees): 0-125 with crepitus Ligamentous laxity (neg): all ligaments appear stable, negative ant. drawer test, negative post. drawer test, stable to varus stress test, stable to valgus stress test, negative Lachman's test, negative pivot shift test, Meniscal Test: negative McMurrays, negative Nacho. Patellofemoral Alignment Test: Q angle-, normal. Muscle Test: good quad strength. Leg examination: calf is soft and non-tender. [de-identified] : Right knee x-rays, standing AP/Lateral and Merchant films, and 45 degree PA standing view, taken at the office today shows diffuse tricompartmental degenerative arthritis, normal alignment, decreased medial joint height especially on the Whittaker view, marginal osteophytes, bone on bone sclerosis, patellofemoral joint space narrowing, peripheral osteophytes, Kellgren and Beni grade 3.   Left knee x-ray merchant view taken at the office today demonstrates significant patellofemoral arthritis with joint space narrowing, osteophytes and a well centered patella.

## 2024-06-07 NOTE — DISCUSSION/SUMMARY
[de-identified] : Discussed at length the nature of the patient's condition. Their right knee symptoms appear secondary to degenerative arthritis. We reviewed operative and nonoperative treatment. While I believe he would eventually benefit from a TKR, he is undergoing cardiac rehabilitation. Therefore, we will continue nonoperatively. Patient was given a right knee cortisone injection today as detailed above for symptomatic relief. I also suggested incorporating flexibility exercises into cardiac rehabilitation, home exercise, weight loss, and Tylenol. They can continue activity as tolerated.  A prescription was given for an ultrasound-guided aspiration of a right knee Baker's cyst.

## 2024-06-07 NOTE — HISTORY OF PRESENT ILLNESS
[de-identified] : FLOR BLAKE, 63 year old male, presents for evaluation of right knee arthritis with a baker cyst. He completed a series of Euflexxa in September that helped. He started to feel pain again over the last few months mostly on the anterior and posterior aspect. He notes tightness in posterior aspect. He stated that he felt two pops in that area and is still having pain. He takes Tylenol as needed. He recently started Prasugrel and baby aspirin as he did have a heart attack in November of 2023 and underwent stent placement. He is currently undergoing cardiac rehab. He does exercises for his knees.

## 2024-06-07 NOTE — PROCEDURE
[de-identified] : RIGHT KNEE CORTISONE INJECTION Discussed at length with the patient the planned steroid and lidocaine injection. The risks, benefits, convalescence and alternatives were reviewed. The possible side effects discussed included but were not limited to: pain, swelling, heat and redness. These symptoms are generally mild but if they are extensive then contact the office. Giving pain relievers by mouth such as NSAIDs or Tylenol can generally treat the reactions to steroid and lidocaine. Rare cases of infection have been noted. Rash, hives and itching may occur post injection. If you have muscle pain or cramps, flushing and or swelling of the face, rapid heart beat, nausea, dizziness, fever, chills, headache, difficulty breathing, swelling in the arms or legs, or have a prickly feeling of your skin, contact a health care provider immediately.  Following this discussion, the knee was prepped with betadine and under sterile conditions 5 cc of 2% lidocaine and 1 cc depo-medrol (40mg) were injected with a 21 gauge needle. The needle was introduced into the joint, aspiration was performed to ensure intra-articular placement and the medication was injected. Upon withdrawal of the needle the site was cleaned with alcohol and a bandaid applied. The patient tolerated the injection well and there were no adverse effects. Post injection instructions included no strenuous activity for 24 hours, cryotherapy and if there are any adverse effects to contact the office.

## 2024-07-10 ENCOUNTER — APPOINTMENT (OUTPATIENT)
Dept: ORTHOPEDIC SURGERY | Facility: CLINIC | Age: 64
End: 2024-07-10
Payer: COMMERCIAL

## 2024-07-10 PROCEDURE — 20610 DRAIN/INJ JOINT/BURSA W/O US: CPT | Mod: RT

## 2024-07-12 ENCOUNTER — APPOINTMENT (OUTPATIENT)
Dept: ORTHOPEDIC SURGERY | Facility: CLINIC | Age: 64
End: 2024-07-12
Payer: COMMERCIAL

## 2024-07-12 ENCOUNTER — APPOINTMENT (OUTPATIENT)
Dept: ORTHOPEDIC SURGERY | Facility: CLINIC | Age: 64
End: 2024-07-12

## 2024-07-12 VITALS — HEIGHT: 69 IN | BODY MASS INDEX: 33.77 KG/M2 | WEIGHT: 228 LBS

## 2024-07-12 DIAGNOSIS — M46.1 SACROILIITIS, NOT ELSEWHERE CLASSIFIED: ICD-10-CM

## 2024-07-12 DIAGNOSIS — M43.17 SPONDYLOLISTHESIS, LUMBOSACRAL REGION: ICD-10-CM

## 2024-07-12 DIAGNOSIS — Z95.5 PRESENCE OF CORONARY ANGIOPLASTY IMPLANT AND GRAFT: ICD-10-CM

## 2024-07-12 PROCEDURE — 72170 X-RAY EXAM OF PELVIS: CPT

## 2024-07-12 PROCEDURE — 99214 OFFICE O/P EST MOD 30 MIN: CPT

## 2024-07-12 PROCEDURE — 72110 X-RAY EXAM L-2 SPINE 4/>VWS: CPT

## 2024-07-12 RX ORDER — CYCLOBENZAPRINE HYDROCHLORIDE 5 MG/1
5 TABLET, FILM COATED ORAL
Qty: 90 | Refills: 0 | Status: ACTIVE | COMMUNITY
Start: 2024-07-12 | End: 1900-01-01

## 2024-07-12 RX ORDER — PRASUGREL 10 MG/1
10 TABLET, FILM COATED ORAL DAILY
Qty: 30 | Refills: 0 | Status: ACTIVE | COMMUNITY
Start: 2024-07-12

## 2024-07-19 ENCOUNTER — APPOINTMENT (OUTPATIENT)
Dept: ORTHOPEDIC SURGERY | Facility: CLINIC | Age: 64
End: 2024-07-19
Payer: COMMERCIAL

## 2024-07-19 DIAGNOSIS — M17.11 UNILATERAL PRIMARY OSTEOARTHRITIS, RIGHT KNEE: ICD-10-CM

## 2024-07-19 PROCEDURE — 20610 DRAIN/INJ JOINT/BURSA W/O US: CPT | Mod: RT

## 2024-07-25 ENCOUNTER — OUTPATIENT (OUTPATIENT)
Dept: OUTPATIENT SERVICES | Facility: HOSPITAL | Age: 64
LOS: 1 days | Discharge: HOME | End: 2024-07-25

## 2024-07-25 DIAGNOSIS — D72.820 LYMPHOCYTOSIS (SYMPTOMATIC): ICD-10-CM

## 2024-07-29 ENCOUNTER — APPOINTMENT (OUTPATIENT)
Dept: ORTHOPEDIC SURGERY | Facility: CLINIC | Age: 64
End: 2024-07-29
Payer: COMMERCIAL

## 2024-07-29 DIAGNOSIS — M17.11 UNILATERAL PRIMARY OSTEOARTHRITIS, RIGHT KNEE: ICD-10-CM

## 2024-07-29 PROCEDURE — 20610 DRAIN/INJ JOINT/BURSA W/O US: CPT | Mod: RT

## 2024-07-29 NOTE — PROCEDURE
[de-identified] : Euflexxa # 3 Right knee Discussed at length with the patient the planned Euflexxa injection. The risks, benefits, convalescence and alternatives were reviewed. The possible side effects discussed included but were not limited to: pain, swelling, heat and redness. There symptoms are generally mild but if they are extensive then contact the office. Giving pain relievers by mouth such as NSAIDs or Tylenol can generally treat the reactions to Euflexxa. Rare cases of infection have been noted. Rash, hives and itching may occur post injection. If you have muscle pain or cramps, flushing and or swelling of the face, rapid heart beat, nausea, dizziness, fever, chills, headache, difficulty breathing, swelling in the arms or legs, or have a prickly feeling of your skin, contact a health care provider immediately.  Following this discussion, the knee was prepped with betadine and under sterile condition the Euflexxa injection was performed with a 22 gauge needle. The needle was introduced into the joint, aspiration was performed to ensure intra-articular placement and the medication was injected. Upon withdrawal of the needle the site was cleaned with alcohol and a bandaid applied. The patient tolerated the injection well and there were no adverse effects. Post injection instructions included no strenuous activity for 24 hours, cryotherapy and if there are any adverse effects to contact the office.

## 2024-07-30 ENCOUNTER — NON-APPOINTMENT (OUTPATIENT)
Age: 64
End: 2024-07-30

## 2024-07-31 ENCOUNTER — RESULT REVIEW (OUTPATIENT)
Age: 64
End: 2024-07-31

## 2024-07-31 ENCOUNTER — APPOINTMENT (OUTPATIENT)
Dept: HEMATOLOGY ONCOLOGY | Facility: CLINIC | Age: 64
End: 2024-07-31
Payer: COMMERCIAL

## 2024-07-31 ENCOUNTER — APPOINTMENT (OUTPATIENT)
Dept: HEMATOLOGY ONCOLOGY | Facility: CLINIC | Age: 64
End: 2024-07-31

## 2024-07-31 VITALS
HEART RATE: 55 BPM | WEIGHT: 220.46 LBS | RESPIRATION RATE: 15 BRPM | BODY MASS INDEX: 32.56 KG/M2 | SYSTOLIC BLOOD PRESSURE: 129 MMHG | OXYGEN SATURATION: 97 % | TEMPERATURE: 98.4 F | DIASTOLIC BLOOD PRESSURE: 82 MMHG

## 2024-07-31 LAB
BASOPHILS # BLD AUTO: 0 K/UL — SIGNIFICANT CHANGE UP (ref 0–0.2)
BASOPHILS NFR BLD AUTO: 0 % — SIGNIFICANT CHANGE UP (ref 0–2)
BURR CELLS BLD QL SMEAR: PRESENT — SIGNIFICANT CHANGE UP
EOSINOPHIL # BLD AUTO: 0.35 K/UL — SIGNIFICANT CHANGE UP (ref 0–0.5)
EOSINOPHIL NFR BLD AUTO: 1 % — SIGNIFICANT CHANGE UP (ref 0–6)
HCT VFR BLD CALC: 39.9 % — SIGNIFICANT CHANGE UP (ref 39–50)
HGB BLD-MCNC: 13 G/DL — SIGNIFICANT CHANGE UP (ref 13–17)
LYMPHOCYTES # BLD AUTO: 29.38 K/UL — HIGH (ref 1–3.3)
LYMPHOCYTES # BLD AUTO: 84 % — HIGH (ref 13–44)
LYMPHOCYTES # SPEC AUTO: 4 % — HIGH (ref 0–0)
MCHC RBC-ENTMCNC: 30.4 PG — SIGNIFICANT CHANGE UP (ref 27–34)
MCHC RBC-ENTMCNC: 32.6 G/DL — SIGNIFICANT CHANGE UP (ref 32–36)
MCV RBC AUTO: 93.2 FL — SIGNIFICANT CHANGE UP (ref 80–100)
MONOCYTES # BLD AUTO: 0.35 K/UL — SIGNIFICANT CHANGE UP (ref 0–0.9)
MONOCYTES NFR BLD AUTO: 1 % — LOW (ref 2–14)
NEUTROPHILS # BLD AUTO: 3.5 K/UL — SIGNIFICANT CHANGE UP (ref 1.8–7.4)
NEUTROPHILS NFR BLD AUTO: 10 % — LOW (ref 43–77)
NRBC # BLD: 0 /100 WBCS — SIGNIFICANT CHANGE UP (ref 0–0)
NRBC # BLD: SIGNIFICANT CHANGE UP /100 WBCS (ref 0–0)
PLAT MORPH BLD: NORMAL — SIGNIFICANT CHANGE UP
PLATELET # BLD AUTO: 190 K/UL — SIGNIFICANT CHANGE UP (ref 150–400)
RBC # BLD: 4.28 M/UL — SIGNIFICANT CHANGE UP (ref 4.2–5.8)
RBC # FLD: 13.6 % — SIGNIFICANT CHANGE UP (ref 10.3–14.5)
RBC BLD AUTO: ABNORMAL
SMUDGE CELLS # BLD: PRESENT — SIGNIFICANT CHANGE UP
WBC # BLD: 34.98 K/UL — HIGH (ref 3.8–10.5)
WBC # FLD AUTO: 34.98 K/UL — HIGH (ref 3.8–10.5)

## 2024-07-31 PROCEDURE — 99214 OFFICE O/P EST MOD 30 MIN: CPT

## 2024-07-31 NOTE — HISTORY OF PRESENT ILLNESS
[de-identified] :  is a 64 year old male with CLL (Diagnosed 2008, 13q del, mutated IGHV), treatment naive, who presents for his yearly follow-up. He continues to get monitoring through the [facility] clinic   He presents today for follow up. He overall feels well overall. No fevers or drenching night sweats. Good appetite. Weight overall stable. Normal bowel movements. No chest pain or shortness of breath. Energy stable. No new or recurrent infections.  Had cardiac stent placed. Is on blood thinners. Maintains active lifestyle. Now retired.  [de-identified] : 13qdel\par  Mutated IgVH

## 2024-07-31 NOTE — ASSESSMENT
[FreeTextEntry1] : Mr. Henderson is a 64 year old male with CLL (diagnosed 2008, 13q deletion, mutated IGHV), treatment naive, who presents for follow-up, no new complaints. No evidence of disease progression, no B sx, no LAD or organomegaly. We can continue with yearly follow-ups.  Plan: #CLL:  -CBC reviewed WBC 35 HGB 13 . Stable -CMP, LDH sent. Will follow up results.  -no B symptoms, splenomegaly or new lymphadenopathy, no indication at this moment for CLL-directed therapy,   #RCHM:  -continue routine follow-up at [facility] clinic as scheduled. -continue routine health maintenance and age appropriate screening as indicated,  annual dermatology evaluation, COVID vaccine and colonoscopies as recommended  OV 1 year

## 2024-07-31 NOTE — PHYSICAL EXAM
[Fully active, able to carry on all pre-disease performance without restriction] : Status 0 - Fully active, able to carry on all pre-disease performance without restriction [Normal] : affect appropriate [de-identified] : EOMI, anicteric sclera, conjunctiva normal

## 2024-08-01 LAB
ALBUMIN SERPL ELPH-MCNC: 4.2 G/DL
ALP BLD-CCNC: 118 U/L
ALT SERPL-CCNC: 23 U/L
ANION GAP SERPL CALC-SCNC: 14 MMOL/L
AST SERPL-CCNC: 22 U/L
BILIRUB SERPL-MCNC: 0.3 MG/DL
BUN SERPL-MCNC: 14 MG/DL
CALCIUM SERPL-MCNC: 9.6 MG/DL
CHLORIDE SERPL-SCNC: 105 MMOL/L
CO2 SERPL-SCNC: 25 MMOL/L
CREAT SERPL-MCNC: 0.79 MG/DL
EGFR: 99 ML/MIN/1.73M2
GLUCOSE SERPL-MCNC: 94 MG/DL
LDH SERPL-CCNC: 201 U/L
POTASSIUM SERPL-SCNC: 4.4 MMOL/L
PROT SERPL-MCNC: 6.4 G/DL
SODIUM SERPL-SCNC: 144 MMOL/L
URATE SERPL-MCNC: 5.3 MG/DL

## 2024-08-23 ENCOUNTER — APPOINTMENT (OUTPATIENT)
Dept: ORTHOPEDIC SURGERY | Facility: CLINIC | Age: 64
End: 2024-08-23

## 2025-01-28 ENCOUNTER — TRANSCRIPTION ENCOUNTER (OUTPATIENT)
Age: 65
End: 2025-01-28

## 2025-06-05 ENCOUNTER — APPOINTMENT (OUTPATIENT)
Dept: GASTROENTEROLOGY | Facility: CLINIC | Age: 65
End: 2025-06-05

## 2025-07-07 ENCOUNTER — NON-APPOINTMENT (OUTPATIENT)
Age: 65
End: 2025-07-07

## 2025-07-08 ENCOUNTER — OUTPATIENT (OUTPATIENT)
Dept: OUTPATIENT SERVICES | Facility: HOSPITAL | Age: 65
LOS: 1 days | Discharge: HOME | End: 2025-07-08

## 2025-07-08 DIAGNOSIS — D72.820 LYMPHOCYTOSIS (SYMPTOMATIC): ICD-10-CM

## 2025-07-09 ENCOUNTER — RESULT REVIEW (OUTPATIENT)
Age: 65
End: 2025-07-09

## 2025-07-09 ENCOUNTER — APPOINTMENT (OUTPATIENT)
Dept: HEMATOLOGY ONCOLOGY | Facility: CLINIC | Age: 65
End: 2025-07-09
Payer: COMMERCIAL

## 2025-07-09 VITALS
WEIGHT: 217.57 LBS | HEIGHT: 68 IN | HEART RATE: 64 BPM | TEMPERATURE: 98.4 F | RESPIRATION RATE: 16 BRPM | OXYGEN SATURATION: 99 % | BODY MASS INDEX: 32.97 KG/M2 | SYSTOLIC BLOOD PRESSURE: 131 MMHG | DIASTOLIC BLOOD PRESSURE: 85 MMHG

## 2025-07-09 LAB
BASOPHILS # BLD AUTO: 0 K/UL — SIGNIFICANT CHANGE UP (ref 0–0.2)
BASOPHILS NFR BLD AUTO: 0 % — SIGNIFICANT CHANGE UP (ref 0–2)
ELLIPTOCYTES BLD QL SMEAR: SLIGHT — SIGNIFICANT CHANGE UP
EOSINOPHIL # BLD AUTO: 0.17 K/UL — SIGNIFICANT CHANGE UP (ref 0–0.5)
EOSINOPHIL NFR BLD AUTO: 0.5 % — SIGNIFICANT CHANGE UP (ref 0–6)
HCT VFR BLD CALC: 39 % — SIGNIFICANT CHANGE UP (ref 39–50)
HGB BLD-MCNC: 12.7 G/DL — LOW (ref 13–17)
LYMPHOCYTES # BLD AUTO: 27.22 K/UL — HIGH (ref 1–3.3)
LYMPHOCYTES # BLD AUTO: 82 % — HIGH (ref 13–44)
LYMPHOCYTES # SPEC AUTO: 6 % — HIGH (ref 0–0)
MCHC RBC-ENTMCNC: 30 PG — SIGNIFICANT CHANGE UP (ref 27–34)
MCHC RBC-ENTMCNC: 32.6 G/DL — SIGNIFICANT CHANGE UP (ref 32–36)
MCV RBC AUTO: 92 FL — SIGNIFICANT CHANGE UP (ref 80–100)
MONOCYTES # BLD AUTO: 0.33 K/UL — SIGNIFICANT CHANGE UP (ref 0–0.9)
MONOCYTES NFR BLD AUTO: 1 % — LOW (ref 2–14)
NEUTROPHILS # BLD AUTO: 3.49 K/UL — SIGNIFICANT CHANGE UP (ref 1.8–7.4)
NEUTROPHILS NFR BLD AUTO: 10.5 % — LOW (ref 43–77)
NRBC # BLD: 0 /100 WBCS — SIGNIFICANT CHANGE UP (ref 0–0)
NRBC BLD AUTO-RTO: SIGNIFICANT CHANGE UP /100 WBCS (ref 0–0)
NRBC BLD-RTO: 0 /100 WBCS — SIGNIFICANT CHANGE UP (ref 0–0)
PLAT MORPH BLD: NORMAL — SIGNIFICANT CHANGE UP
PLATELET # BLD AUTO: 190 K/UL — SIGNIFICANT CHANGE UP (ref 150–400)
POIKILOCYTOSIS BLD QL AUTO: SLIGHT — SIGNIFICANT CHANGE UP
RBC # BLD: 4.24 M/UL — SIGNIFICANT CHANGE UP (ref 4.2–5.8)
RBC # FLD: 13.2 % — SIGNIFICANT CHANGE UP (ref 10.3–14.5)
RBC BLD AUTO: ABNORMAL
SMUDGE CELLS # BLD: PRESENT — SIGNIFICANT CHANGE UP
WBC # BLD: 33.2 K/UL — HIGH (ref 3.8–10.5)
WBC # FLD AUTO: 33.2 K/UL — HIGH (ref 3.8–10.5)

## 2025-07-09 PROCEDURE — 99214 OFFICE O/P EST MOD 30 MIN: CPT

## 2025-07-10 LAB
ALBUMIN SERPL ELPH-MCNC: 4.1 G/DL
ALP BLD-CCNC: 118 U/L
ALT SERPL-CCNC: 27 U/L
ANION GAP SERPL CALC-SCNC: 14 MMOL/L
AST SERPL-CCNC: 28 U/L
BILIRUB SERPL-MCNC: 0.4 MG/DL
BUN SERPL-MCNC: 17 MG/DL
CALCIUM SERPL-MCNC: 9.1 MG/DL
CHLORIDE SERPL-SCNC: 107 MMOL/L
CO2 SERPL-SCNC: 23 MMOL/L
CREAT SERPL-MCNC: 0.81 MG/DL
DEPRECATED KAPPA LC FREE/LAMBDA SER: 1.4 RATIO
EGFRCR SERPLBLD CKD-EPI 2021: 98 ML/MIN/1.73M2
GLUCOSE SERPL-MCNC: 88 MG/DL
IGA SERPL-MCNC: 129 MG/DL
IGG SERPL-MCNC: 703 MG/DL
IGM SERPL-MCNC: 102 MG/DL
KAPPA LC CSF-MCNC: 1.54 MG/DL
KAPPA LC SERPL-MCNC: 2.15 MG/DL
LDH SERPL-CCNC: 186 U/L
POTASSIUM SERPL-SCNC: 4.7 MMOL/L
PROT SERPL-MCNC: 6.5 G/DL
SODIUM SERPL-SCNC: 143 MMOL/L
URATE SERPL-MCNC: 4.5 MG/DL

## 2025-07-31 ENCOUNTER — APPOINTMENT (OUTPATIENT)
Dept: UROLOGY | Facility: CLINIC | Age: 65
End: 2025-07-31
Payer: COMMERCIAL

## 2025-07-31 DIAGNOSIS — Z00.00 ENCOUNTER FOR GENERAL ADULT MEDICAL EXAMINATION W/OUT ABNORMAL FINDINGS: ICD-10-CM

## 2025-07-31 DIAGNOSIS — R39.9 UNSPECIFIED SYMPTOMS AND SIGNS INVOLVING THE GENITOURINARY SYSTEM: ICD-10-CM

## 2025-07-31 LAB
BILIRUB UR QL STRIP: NEGATIVE
CLARITY UR: CLEAR
COLLECTION METHOD: NORMAL
GLUCOSE UR-MCNC: NEGATIVE
HCG UR QL: 0.2 EU/DL
HGB UR QL STRIP.AUTO: NEGATIVE
KETONES UR-MCNC: NEGATIVE
LEUKOCYTE ESTERASE UR QL STRIP: ABNORMAL
NITRITE UR QL STRIP: NEGATIVE
PH UR STRIP: 5.5
PROT UR STRIP-MCNC: NEGATIVE
SP GR UR STRIP: 1.01

## 2025-07-31 PROCEDURE — 99203 OFFICE O/P NEW LOW 30 MIN: CPT | Mod: 25

## 2025-07-31 PROCEDURE — 81003 URINALYSIS AUTO W/O SCOPE: CPT | Mod: QW

## 2025-07-31 PROCEDURE — 51798 US URINE CAPACITY MEASURE: CPT

## 2025-07-31 RX ORDER — TAMSULOSIN HYDROCHLORIDE 0.4 MG/1
0.4 CAPSULE ORAL AT BEDTIME
Qty: 30 | Refills: 4 | Status: ACTIVE | COMMUNITY
Start: 2025-07-31 | End: 1900-01-01

## 2025-08-01 LAB
PSA FREE FLD-MCNC: 13 %
PSA FREE SERPL-MCNC: 0.09 NG/ML
PSA SERPL-MCNC: 0.73 NG/ML

## 2025-09-04 ENCOUNTER — APPOINTMENT (OUTPATIENT)
Dept: UROLOGY | Facility: CLINIC | Age: 65
End: 2025-09-04
Payer: COMMERCIAL

## 2025-09-04 DIAGNOSIS — R39.15 URGENCY OF URINATION: ICD-10-CM

## 2025-09-04 DIAGNOSIS — R35.1 NOCTURIA: ICD-10-CM

## 2025-09-04 PROCEDURE — 99213 OFFICE O/P EST LOW 20 MIN: CPT

## 2025-09-04 PROCEDURE — G2211 COMPLEX E/M VISIT ADD ON: CPT | Mod: NC

## 2025-09-04 RX ORDER — MIRABEGRON 50 MG/1
50 TABLET, EXTENDED RELEASE ORAL DAILY
Qty: 1 | Refills: 1 | Status: ACTIVE | COMMUNITY
Start: 2025-09-04 | End: 1900-01-01